# Patient Record
Sex: FEMALE | Race: WHITE | NOT HISPANIC OR LATINO | Employment: OTHER | URBAN - METROPOLITAN AREA
[De-identification: names, ages, dates, MRNs, and addresses within clinical notes are randomized per-mention and may not be internally consistent; named-entity substitution may affect disease eponyms.]

---

## 2017-09-19 ENCOUNTER — TELEPHONE (OUTPATIENT)
Dept: RADIATION ONCOLOGY | Facility: MEDICAL CENTER | Age: 67
End: 2017-09-19

## 2017-09-19 DIAGNOSIS — D32.0 BENIGN NEOPLASM OF CEREBRAL MENINGES (HCC): ICD-10-CM

## 2017-09-19 NOTE — TELEPHONE ENCOUNTER
3. SPECIFIC Action To Be Taken: Lab orders needed     4. Diagnosis/Clinical Reason for Request:MRI with and without scheduled 9/26/17    5. Specialty & Provider Name/Lab/Imaging Location: [facility]     6. Is appointment scheduled for requested order: N\A ;

## 2017-09-20 ENCOUNTER — HOSPITAL ENCOUNTER (OUTPATIENT)
Dept: LAB | Facility: MEDICAL CENTER | Age: 67
End: 2017-09-20
Attending: INTERNAL MEDICINE
Payer: MEDICARE

## 2017-09-20 DIAGNOSIS — E03.4 HYPOTHYROIDISM DUE TO ACQUIRED ATROPHY OF THYROID: ICD-10-CM

## 2017-09-20 DIAGNOSIS — I10 ESSENTIAL HYPERTENSION: ICD-10-CM

## 2017-09-20 DIAGNOSIS — E78.5 HYPERLIPIDEMIA, UNSPECIFIED HYPERLIPIDEMIA TYPE: ICD-10-CM

## 2017-09-20 LAB
ALBUMIN SERPL BCP-MCNC: 3.6 G/DL (ref 3.2–4.9)
ALBUMIN/GLOB SERPL: 1.6 G/DL
ALP SERPL-CCNC: 124 U/L (ref 30–99)
ALT SERPL-CCNC: 30 U/L (ref 2–50)
ANION GAP SERPL CALC-SCNC: 9 MMOL/L (ref 0–11.9)
AST SERPL-CCNC: 23 U/L (ref 12–45)
BASOPHILS # BLD AUTO: 0.7 % (ref 0–1.8)
BASOPHILS # BLD: 0.06 K/UL (ref 0–0.12)
BILIRUB SERPL-MCNC: 1 MG/DL (ref 0.1–1.5)
BUN SERPL-MCNC: 12 MG/DL (ref 8–22)
CALCIUM SERPL-MCNC: 9.3 MG/DL (ref 8.5–10.5)
CHLORIDE SERPL-SCNC: 107 MMOL/L (ref 96–112)
CHOLEST SERPL-MCNC: 195 MG/DL (ref 100–199)
CO2 SERPL-SCNC: 23 MMOL/L (ref 20–33)
CREAT SERPL-MCNC: 0.55 MG/DL (ref 0.5–1.4)
EOSINOPHIL # BLD AUTO: 0.17 K/UL (ref 0–0.51)
EOSINOPHIL NFR BLD: 1.9 % (ref 0–6.9)
ERYTHROCYTE [DISTWIDTH] IN BLOOD BY AUTOMATED COUNT: 42.5 FL (ref 35.9–50)
GFR SERPL CREATININE-BSD FRML MDRD: >60 ML/MIN/1.73 M 2
GLOBULIN SER CALC-MCNC: 2.3 G/DL (ref 1.9–3.5)
GLUCOSE SERPL-MCNC: 105 MG/DL (ref 65–99)
HCT VFR BLD AUTO: 44.2 % (ref 37–47)
HDLC SERPL-MCNC: 88 MG/DL
HGB BLD-MCNC: 14.6 G/DL (ref 12–16)
IMM GRANULOCYTES # BLD AUTO: 0.04 K/UL (ref 0–0.11)
IMM GRANULOCYTES NFR BLD AUTO: 0.5 % (ref 0–0.9)
LDLC SERPL CALC-MCNC: 95 MG/DL
LYMPHOCYTES # BLD AUTO: 2.24 K/UL (ref 1–4.8)
LYMPHOCYTES NFR BLD: 25.7 % (ref 22–41)
MCH RBC QN AUTO: 28.1 PG (ref 27–33)
MCHC RBC AUTO-ENTMCNC: 33 G/DL (ref 33.6–35)
MCV RBC AUTO: 85 FL (ref 81.4–97.8)
MONOCYTES # BLD AUTO: 0.76 K/UL (ref 0–0.85)
MONOCYTES NFR BLD AUTO: 8.7 % (ref 0–13.4)
NEUTROPHILS # BLD AUTO: 5.46 K/UL (ref 2–7.15)
NEUTROPHILS NFR BLD: 62.5 % (ref 44–72)
NRBC # BLD AUTO: 0 K/UL
NRBC BLD AUTO-RTO: 0 /100 WBC
PLATELET # BLD AUTO: 275 K/UL (ref 164–446)
PMV BLD AUTO: 10.8 FL (ref 9–12.9)
POTASSIUM SERPL-SCNC: 3.8 MMOL/L (ref 3.6–5.5)
PROT SERPL-MCNC: 5.9 G/DL (ref 6–8.2)
RBC # BLD AUTO: 5.2 M/UL (ref 4.2–5.4)
SODIUM SERPL-SCNC: 139 MMOL/L (ref 135–145)
T4 FREE SERPL-MCNC: 1.52 NG/DL (ref 0.53–1.43)
TRIGL SERPL-MCNC: 60 MG/DL (ref 0–149)
TSH SERPL DL<=0.005 MIU/L-ACNC: <0.015 UIU/ML (ref 0.3–3.7)
WBC # BLD AUTO: 8.7 K/UL (ref 4.8–10.8)

## 2017-09-20 PROCEDURE — 84439 ASSAY OF FREE THYROXINE: CPT

## 2017-09-20 PROCEDURE — 84443 ASSAY THYROID STIM HORMONE: CPT

## 2017-09-20 PROCEDURE — 36415 COLL VENOUS BLD VENIPUNCTURE: CPT

## 2017-09-20 PROCEDURE — 80061 LIPID PANEL: CPT

## 2017-09-20 PROCEDURE — 85025 COMPLETE CBC W/AUTO DIFF WBC: CPT

## 2017-09-20 PROCEDURE — 80053 COMPREHEN METABOLIC PANEL: CPT

## 2017-09-26 ENCOUNTER — HOSPITAL ENCOUNTER (OUTPATIENT)
Dept: RADIOLOGY | Facility: MEDICAL CENTER | Age: 67
End: 2017-09-26
Attending: RADIOLOGY
Payer: MEDICARE

## 2017-09-26 DIAGNOSIS — D32.9 MENINGIOMA (HCC): ICD-10-CM

## 2017-09-26 PROCEDURE — A9579 GAD-BASE MR CONTRAST NOS,1ML: HCPCS | Performed by: RADIOLOGY

## 2017-09-26 PROCEDURE — 700117 HCHG RX CONTRAST REV CODE 255: Performed by: RADIOLOGY

## 2017-09-26 PROCEDURE — 70553 MRI BRAIN STEM W/O & W/DYE: CPT

## 2017-09-26 RX ADMIN — GADODIAMIDE 17 ML: 287 INJECTION INTRAVENOUS at 08:59

## 2017-09-28 ENCOUNTER — HOSPITAL ENCOUNTER (OUTPATIENT)
Dept: RADIATION ONCOLOGY | Facility: MEDICAL CENTER | Age: 67
End: 2017-09-30
Attending: RADIOLOGY
Payer: MEDICARE

## 2017-09-28 VITALS
WEIGHT: 170 LBS | HEART RATE: 83 BPM | SYSTOLIC BLOOD PRESSURE: 149 MMHG | OXYGEN SATURATION: 94 % | BODY MASS INDEX: 29.18 KG/M2 | TEMPERATURE: 96.9 F | DIASTOLIC BLOOD PRESSURE: 83 MMHG | RESPIRATION RATE: 22 BRPM

## 2017-09-28 DIAGNOSIS — D32.9 MENINGIOMA (HCC): ICD-10-CM

## 2017-09-28 PROCEDURE — 99214 OFFICE O/P EST MOD 30 MIN: CPT | Performed by: RADIOLOGY

## 2017-09-28 PROCEDURE — 99212 OFFICE O/P EST SF 10 MIN: CPT | Performed by: RADIOLOGY

## 2017-09-28 ASSESSMENT — PAIN SCALES - GENERAL: PAINLEVEL: 5=MODERATE PAIN

## 2017-09-28 NOTE — NON-PROVIDER
Patient was seen today in clinic with Dr. Olguin for follow up.  Vitals signs and weight were obtained and pain assessment was completed.  Allergies and medications were reviewed with the patient.  Review of systems completed.     Vitals/Pain:  Vitals:    09/28/17 1117   BP: 149/83   Pulse: 83   Resp: (!) 22   Temp: 36.1 °C (96.9 °F)   SpO2: 94%   Weight: 77.1 kg (170 lb)   Pain Score: 5=Moderate Pain  Location: Back, Rib Cage, Knee, Wrist      Allergies:   Alcohol; Asa [aspirin]; Bee; Iodine; Shellfish allergy; Sulfa drugs; Tetanus antitoxin; Tape; Other environmental; and Synthroid [levothroid]    Current Medications:    Current Outpatient Prescriptions:   •  thyroid (ARMOUR THYROID) 120 MG Tab, TAKE 1/2 TABLET ORALLY TWICE A DAY, Disp: 90 Tab, Rfl: 4  •  thyroid (ARMOUR THYROID) 90 MG Tab, TAKE 1 TABLET BY MOUTH ONCE DAILY, Disp: 90 Tab, Rfl: 4  •  liothyronine (CYTOMEL) 25 MCG Tab, Take 1 Tab by mouth every day., Disp: 90 Tab, Rfl: 4  •  furosemide (LASIX) 20 MG Tab, Take 1 Tab by mouth every day., Disp: 90 Tab, Rfl: 4  •  atenolol (TENORMIN) 50 MG Tab, Take 1 Tab by mouth every day., Disp: 90 Tab, Rfl: 4  •  omeprazole (PRILOSEC) 20 MG delayed-release capsule, Take 1 Cap by mouth every day., Disp: 30 Cap, Rfl: 11  •  trazodone (DESYREL) 150 MG Tab, Take 1 Tab by mouth every bedtime., Disp: 30 Tab, Rfl: 11  •  hydrocodone-acetaminophen (NORCO) 7.5-325 MG per tablet, Take 1-2 Tabs by mouth every 6 hours as needed., Disp: 20 Tab, Rfl: 0      PCP:  Noemy Miller R.N.   9/28/2017  11:23 AM

## 2017-09-28 NOTE — PROGRESS NOTES
RADIATION ONCOLOGY FOLLOW UP    DATE OF SERVICE: 9/28/2017    IDENTIFICATION: Right-sided cavernous sinus meningioma, treated with stereotactic   radiosurgery to 2100 cGy in 3 fractions completing in March 2015.       INTERVAL HISTORY: I had the pleasure of seeing Ms. Whitmore today in follow up for her meningioma.  She states from a symptom standpoint she's been doing quite well since we saw her last year. He does not have any signs or symptoms she would attribute to her brain tumor or treatment. R restaging MRI scan that shows no evidence of active disease. She's otherwise been in her usual health    PHYSICAL EXAM:    Vitals:    09/28/17 1117   BP: 149/83   Pulse: 83   Resp: (!) 22   Temp: 36.1 °C (96.9 °F)   SpO2: 94%   Weight: 77.1 kg (170 lb)   Pain Score: 5=Moderate Pain  Location: Back, Rib Cage, Knee, Wrist      0= Fully active, able to carry on all pre-disease performance without restriction.    PAIN:  5  What makes the pain better: norco  What makes the pain worse: stairs  Pain controlled with current regimen: yes  Pain related to condition being seen here for: no    GENERAL: No apparent distress.  HEENT:  Pupils are equal, round, and reactive to light.  Extraocular muscles   are intact. Sclerae nonicteric.  Conjunctivae pink.  Oral cavity, tongue   protrudes midline.   NECK:  Supple without evidence of thyromegaly.  NODES:  No peripheral adenopathy of the neck, supraclavicular fossa or axillae   bilaterally.  LUNGS:  Clear to ascultation bilaterally   HEART:  Regular rate and rhythm.  No murmur appreciated  ABDOMEN:  Soft. No evidence of hepatosplenomegaly.  Positive bowel sounds.  EXTREMITIES:  Without Edema.  NEUROLOGIC:  Cranial nerves II through XII were intact. Normal stance and gait motor and sensory grossly within normal limits      IMPRESSION: Right-sided cavernous sinus meningioma, treated with stereotactic   radiosurgery to 2100 cGy in 3 fractions completing in March 2015.         RECOMMENDATIONS:    I discussed the patient her findings over a 35 minute time period, 95% of that time dedicated to an ongoing survivorship plan.Discussed with the patient and her  and I would like to at least get one more annual MRI scan of her brain. Findings are still within normal limits and we could consider going to an every other year basis. No plan on seeing her after her MRI next year.    If patient has any questions or concerns, she should feel free to contact me.

## 2017-10-09 ENCOUNTER — TELEPHONE (OUTPATIENT)
Dept: MEDICAL GROUP | Age: 67
End: 2017-10-09

## 2017-10-09 NOTE — TELEPHONE ENCOUNTER
ESTABLISHED PATIENT PRE-VISIT PLANNING     Note: Patient will not be contacted if there is no indication to call.     1.  Reviewed notes from the last few office visits within the medical group: Yes    2.  If any orders were placed at last visit or intended to be done for this visit (i.e. 6 mos follow-up), do we have Results/Consult Notes?        •  Labs - Labs ordered, completed on 9/20/17 and results are in chart.   Note: If patient appointment is for lab review and patient did not complete labs,                check with provider if OK to reschedule patient until labs completed.       •  Imaging - Imaging was not ordered at last office visit.       •  Referrals - No referrals were ordered at last office visit.    3. Is this appointment scheduled as a Hospital Follow-Up? No    4.  Immunizations were updated in Epic using WebIZ?: No WebIZ record       •  Web Iz Recommendations:     5.  Patient is due for the following Health Maintenance Topics:   Health Maintenance Due   Topic Date Due   • Annual Wellness Visit  1950   • IMM DTaP/Tdap/Td Vaccine (1 - Tdap) 12/07/1967   • IMM ZOSTER VACCINE  12/07/2008   • IMM PNEUMOCOCCAL 65+ (ADULT) LOW/MEDIUM RISK SERIES (1 of 2 - PCV13) 12/07/2013   • IMM INFLUENZA (1) 09/01/2017           6.  Patient was NOT informed to arrive 15 min prior to their scheduled appointment and bring in their medication bottles.

## 2017-10-10 ENCOUNTER — OFFICE VISIT (OUTPATIENT)
Dept: MEDICAL GROUP | Age: 67
End: 2017-10-10
Payer: MEDICARE

## 2017-10-10 VITALS
HEIGHT: 64 IN | TEMPERATURE: 97.9 F | HEART RATE: 88 BPM | BODY MASS INDEX: 29.88 KG/M2 | OXYGEN SATURATION: 97 % | SYSTOLIC BLOOD PRESSURE: 124 MMHG | WEIGHT: 175 LBS | DIASTOLIC BLOOD PRESSURE: 70 MMHG

## 2017-10-10 DIAGNOSIS — E78.2 MIXED HYPERLIPIDEMIA: ICD-10-CM

## 2017-10-10 DIAGNOSIS — D32.9 MENINGIOMA (HCC): ICD-10-CM

## 2017-10-10 DIAGNOSIS — E66.9 OBESITY (BMI 30.0-34.9): ICD-10-CM

## 2017-10-10 DIAGNOSIS — E03.4 HYPOTHYROIDISM DUE TO ACQUIRED ATROPHY OF THYROID: ICD-10-CM

## 2017-10-10 DIAGNOSIS — M16.0 PRIMARY OSTEOARTHRITIS OF BOTH HIPS: ICD-10-CM

## 2017-10-10 DIAGNOSIS — D32.9 MENINGIOMA OF RIGHT SPHENOID WING INVOLVING CAVERNOUS SINUS (HCC): ICD-10-CM

## 2017-10-10 DIAGNOSIS — I10 ESSENTIAL HYPERTENSION: ICD-10-CM

## 2017-10-10 DIAGNOSIS — M54.6 ACUTE MIDLINE THORACIC BACK PAIN: ICD-10-CM

## 2017-10-10 DIAGNOSIS — Z91.81: ICD-10-CM

## 2017-10-10 DIAGNOSIS — R73.01 IMPAIRED FASTING GLUCOSE: ICD-10-CM

## 2017-10-10 DIAGNOSIS — K21.00 GASTROESOPHAGEAL REFLUX DISEASE WITH ESOPHAGITIS: ICD-10-CM

## 2017-10-10 DIAGNOSIS — R07.81 RIB PAIN ON RIGHT SIDE: ICD-10-CM

## 2017-10-10 PROCEDURE — 99215 OFFICE O/P EST HI 40 MIN: CPT | Performed by: INTERNAL MEDICINE

## 2017-10-10 ASSESSMENT — ENCOUNTER SYMPTOMS
RESPIRATORY NEGATIVE: 1
EYES NEGATIVE: 1
CARDIOVASCULAR NEGATIVE: 1
MUSCULOSKELETAL NEGATIVE: 1
PSYCHIATRIC NEGATIVE: 1
CONSTITUTIONAL NEGATIVE: 1
NEUROLOGICAL NEGATIVE: 1
GASTROINTESTINAL NEGATIVE: 1

## 2017-10-11 NOTE — PROGRESS NOTES
"Subjective:      Amanda Whitmore is a 66 y.o. female who presents with Rib Pain (right side rib cage pain s/p fall x 2 weeks ago )  The patient is here for followup of chronic medical problems listed below. The patient is compliant with medications and having no side effects from them. Denies chest pain, abdominal pain, dyspnea, myalgias, or cough.      Outpatient Medications Prior to Visit   Medication Sig Dispense Refill   • thyroid (ARMOUR THYROID) 120 MG Tab TAKE 1/2 TABLET ORALLY TWICE A DAY 90 Tab 4   • thyroid (ARMOUR THYROID) 90 MG Tab TAKE 1 TABLET BY MOUTH ONCE DAILY 90 Tab 4   • liothyronine (CYTOMEL) 25 MCG Tab Take 1 Tab by mouth every day. 90 Tab 4   • furosemide (LASIX) 20 MG Tab Take 1 Tab by mouth every day. 90 Tab 4   • atenolol (TENORMIN) 50 MG Tab Take 1 Tab by mouth every day. 90 Tab 4   • omeprazole (PRILOSEC) 20 MG delayed-release capsule Take 1 Cap by mouth every day. 30 Cap 11   • trazodone (DESYREL) 150 MG Tab Take 1 Tab by mouth every bedtime. 30 Tab 11   • hydrocodone-acetaminophen (NORCO) 7.5-325 MG per tablet Take 1-2 Tabs by mouth every 6 hours as needed. 20 Tab 0     No facility-administered medications prior to visit.        Allergies   Allergen Reactions   • Alcohol Anaphylaxis     ETOH   • Asa [Aspirin] Anaphylaxis   • Bee Anaphylaxis   • Iodine Hives and Shortness of Breath     Contrast; \"will black out\"   • Shellfish Allergy Anaphylaxis   • Sulfa Drugs Anaphylaxis     \"will kill me\"   • Tetanus Antitoxin Nausea and Swelling     \"Swelling and violently ill\"; increased temp!!!!!!   • Tape Contact Dermatitis     Paper tape ok; will get welts and peeling skin with regular tape   • Other Environmental      Pt states that she is allergic \"to the sun\"   • Synthroid [Levothroid]      Nausea fatigue malaise     .          HPI    Review of Systems   Constitutional: Negative.    HENT: Negative.    Eyes: Negative.    Respiratory: Negative.    Cardiovascular: Negative.  " "  Gastrointestinal: Negative.    Genitourinary: Negative.    Musculoskeletal: Negative.    Skin: Negative.    Neurological: Negative.    Endo/Heme/Allergies: Negative.    Psychiatric/Behavioral: Negative.           Objective:     /70   Pulse 88   Temp 36.6 °C (97.9 °F)   Ht 1.626 m (5' 4.02\")   Wt 79.4 kg (175 lb)   SpO2 97%   BMI 30.02 kg/m²      Physical Exam   Constitutional: She is oriented to person, place, and time. She appears well-developed and well-nourished. No distress.   HENT:   Head: Normocephalic and atraumatic.   Right Ear: External ear normal.   Left Ear: External ear normal.   Nose: Nose normal.   Mouth/Throat: Oropharynx is clear and moist. No oropharyngeal exudate.   Eyes: Conjunctivae and EOM are normal. Pupils are equal, round, and reactive to light. Right eye exhibits no discharge. Left eye exhibits no discharge. No scleral icterus.   Neck: Normal range of motion. Neck supple. No JVD present. No tracheal deviation present. No thyromegaly present.   Cardiovascular: Normal rate, regular rhythm, normal heart sounds and intact distal pulses.  Exam reveals no gallop and no friction rub.    No murmur heard.  Pulmonary/Chest: Effort normal and breath sounds normal. No stridor. No respiratory distress. She has no wheezes. She has no rales. She exhibits no tenderness.   Abdominal: Soft. Bowel sounds are normal. She exhibits no distension and no mass. There is no tenderness. There is no rebound and no guarding.   Musculoskeletal: Normal range of motion. She exhibits no edema or tenderness.   Lymphadenopathy:     She has no cervical adenopathy.   Neurological: She is alert and oriented to person, place, and time. She has normal reflexes. She displays normal reflexes. No cranial nerve deficit. She exhibits normal muscle tone. Coordination normal.   Skin: Skin is warm and dry. No rash noted. She is not diaphoretic. No erythema. No pallor.   Psychiatric: She has a normal mood and affect. Her " behavior is normal. Judgment and thought content normal.   Vitals reviewed.    Hospital Outpatient Visit on 09/20/2017   Component Date Value   • WBC 09/20/2017 8.7    • RBC 09/20/2017 5.20    • Hemoglobin 09/20/2017 14.6    • Hematocrit 09/20/2017 44.2    • MCV 09/20/2017 85.0    • MCH 09/20/2017 28.1    • MCHC 09/20/2017 33.0*   • RDW 09/20/2017 42.5    • Platelet Count 09/20/2017 275    • MPV 09/20/2017 10.8    • Neutrophils-Polys 09/20/2017 62.50    • Lymphocytes 09/20/2017 25.70    • Monocytes 09/20/2017 8.70    • Eosinophils 09/20/2017 1.90    • Basophils 09/20/2017 0.70    • Immature Granulocytes 09/20/2017 0.50    • Nucleated RBC 09/20/2017 0.00    • Neutrophils (Absolute) 09/20/2017 5.46    • Lymphs (Absolute) 09/20/2017 2.24    • Monos (Absolute) 09/20/2017 0.76    • Eos (Absolute) 09/20/2017 0.17    • Baso (Absolute) 09/20/2017 0.06    • Immature Granulocytes (a* 09/20/2017 0.04    • NRBC (Absolute) 09/20/2017 0.00    • Sodium 09/20/2017 139    • Potassium 09/20/2017 3.8    • Chloride 09/20/2017 107    • Co2 09/20/2017 23    • Anion Gap 09/20/2017 9.0    • Glucose 09/20/2017 105*   • Bun 09/20/2017 12    • Creatinine 09/20/2017 0.55    • Calcium 09/20/2017 9.3    • AST(SGOT) 09/20/2017 23    • ALT(SGPT) 09/20/2017 30    • Alkaline Phosphatase 09/20/2017 124*   • Total Bilirubin 09/20/2017 1.0    • Albumin 09/20/2017 3.6    • Total Protein 09/20/2017 5.9*   • Globulin 09/20/2017 2.3    • A-G Ratio 09/20/2017 1.6    • Cholesterol,Tot 09/20/2017 195    • Triglycerides 09/20/2017 60    • HDL 09/20/2017 88    • LDL 09/20/2017 95    • TSH 09/20/2017 <0.015*   • Free T-4 09/20/2017 1.52*   • GFR If  09/20/2017 >60    • GFR If Non  [employer]i* 09/20/2017 >60       Lab Results   Component Value Date/Time    HBA1C 5.8 (H) 02/18/2014 06:04 AM    HBA1C 6.0 (H) 08/08/2013 08:11 AM     Lab Results   Component Value Date/Time    SODIUM 139 09/20/2017 06:06 AM    POTASSIUM 3.8 09/20/2017 06:06 AM     CHLORIDE 107 09/20/2017 06:06 AM    CO2 23 09/20/2017 06:06 AM    GLUCOSE 105 (H) 09/20/2017 06:06 AM    BUN 12 09/20/2017 06:06 AM    CREATININE 0.55 09/20/2017 06:06 AM    CREATININE 0.63 01/31/2013 08:06 AM    BUNCREATRAT 29 (H) 08/08/2013 08:11 AM    BUNCREATRAT 29 (H) 01/31/2013 08:06 AM    ALKPHOSPHAT 124 (H) 09/20/2017 06:06 AM    ASTSGOT 23 09/20/2017 06:06 AM    ALTSGPT 30 09/20/2017 06:06 AM    TBILIRUBIN 1.0 09/20/2017 06:06 AM     No results found for: INR  Lab Results   Component Value Date/Time    CHOLSTRLTOT 195 09/20/2017 06:06 AM    LDL 95 09/20/2017 06:06 AM    HDL 88 09/20/2017 06:06 AM    TRIGLYCERIDE 60 09/20/2017 06:06 AM       No results found for: TESTOSTERONE  Lab Results   Component Value Date/Time    TSH <0.006 (L) 08/08/2013 08:11 AM    TSH <0.006 (L) 01/31/2013 08:06 AM     Lab Results   Component Value Date/Time    FREET4 1.52 (H) 09/20/2017 06:06 AM    FREET4 1.33 09/27/2016 07:32 AM     No results found for: URICACID  No components found for: VITB12  Lab Results   Component Value Date/Time    25HYDROXY 19 (L) 09/27/2016 07:32 AM    25HYDROXY 28 (L) 02/18/2016 06:07 AM     Who is late on            Assessment/Plan:     1. Rib pain on right side-injury 2 weeks ago-New problem. Fell on right side 2 weeks ago and may have sustained a fracture. Also rule out pneumothorax.    Pain medication for now.  - [id number] (WITH 1-VIEW CXR) RIGHT; Future    2. Meningioma of right sphenoid wing involving cavernous sinus (CMS-HCC)       Under good control. Continue same regimen.    3. Meningioma- dr russo; no change in mri done 2017 since 2015     Under good control. Continue same regimen.    4. Essential hypertension       Under good control. Continue same regimen.  5. Mixed hyperlipidemia     Under good control. Continue same regimen.    6. Hypothyroidism due to acquired atrophy of thyroid      Under good control. Continue same regimen.  - T3 FREE; Future    7. Acute midline thoracic back  pain- r/o compression rx thoracic and lumbar spine     - DX-THORACOLUMBAR SPINE-2 VIEWS; Future    8. Primary osteoarthritis of both hips- dr browne; also rx's  Norco      Under good control. Continue same regimen.  9. Impaired fasting glucose    Under good control. Continue same regimen.  10. Gastroesophageal reflux disease with esophagitis     Under good control. Continue same regimen.    11. At high risk for falls per Allyson fall risk assessment scale      - Patient identified as fall risk.  Appropriate orders and counseling given.    12. Obesity (BMI 30.0-34.9)       diet/exercise/lose 15 lbs.; patient counseled    - Patient identified as having weight management issue.  Appropriate orders and counseling given.      40 minute face-to-face encounter took place today.  More than half of this time was spent in the coordination of care of the above problems, as well as counseling.

## 2017-10-12 ENCOUNTER — HOSPITAL ENCOUNTER (OUTPATIENT)
Dept: LAB | Facility: MEDICAL CENTER | Age: 67
End: 2017-10-12
Attending: INTERNAL MEDICINE
Payer: MEDICARE

## 2017-10-12 DIAGNOSIS — E03.4 HYPOTHYROIDISM DUE TO ACQUIRED ATROPHY OF THYROID: ICD-10-CM

## 2017-10-12 LAB — T3FREE SERPL-MCNC: 5.3 PG/ML (ref 2.4–4.2)

## 2017-10-12 PROCEDURE — 84481 FREE ASSAY (FT-3): CPT

## 2017-10-12 PROCEDURE — 36415 COLL VENOUS BLD VENIPUNCTURE: CPT

## 2017-10-13 ENCOUNTER — HOSPITAL ENCOUNTER (OUTPATIENT)
Dept: RADIOLOGY | Facility: MEDICAL CENTER | Age: 67
End: 2017-10-13
Attending: INTERNAL MEDICINE
Payer: MEDICARE

## 2017-10-13 DIAGNOSIS — R07.81 RIB PAIN ON RIGHT SIDE: ICD-10-CM

## 2017-10-13 DIAGNOSIS — M54.6 ACUTE MIDLINE THORACIC BACK PAIN: ICD-10-CM

## 2017-10-13 PROCEDURE — 71101 X-RAY EXAM UNILAT RIBS/CHEST: CPT | Mod: RT

## 2017-10-13 PROCEDURE — 72080 X-RAY EXAM THORACOLMB 2/> VW: CPT

## 2017-10-26 ENCOUNTER — TELEPHONE (OUTPATIENT)
Dept: MEDICAL GROUP | Age: 67
End: 2017-10-26

## 2017-10-26 NOTE — TELEPHONE ENCOUNTER
ANNUAL WELLNESS VISIT PRE-VISIT PLANNING     1.  Reviewed note from last office visit with PCP: YES    2.  If any orders were placed at last visit, do we have Results/Consult Notes?        •  Labs - Labs ordered, completed on 10/12 and results are in chart.   Note: If patient appointment is for lab review and patient did not complete labs,                check with provider if OK to reschedule patient until labs completed.       •  Imaging - Imaging ordered, completed and results are in chart.       •  Referrals - No referrals were ordered at last office visit.    3.  Immunizations were updated in Epic using WebIZ?: No WebIZ record       •  WebIZ Recommendations: FLU, PREVNAR (PCV13) , TDAP and ZOSTAVAX (Shingles)       •  Is patient due for Tdap? YES. Patient was notified of copay/out of pocket cost.       •  Is patient due for Shingles? YES. Patient was notified of copay/out of pocket cost.     4.  Patient is due for the following Health Maintenance Topics:   Health Maintenance Due   Topic Date Due   • Annual Wellness Visit  1950   • IMM DTaP/Tdap/Td Vaccine (1 - Tdap) 12/07/1967   • IMM ZOSTER VACCINE  12/07/2008   • IMM PNEUMOCOCCAL 65+ (ADULT) LOW/MEDIUM RISK SERIES (1 of 2 - PCV13) 12/07/2013   • IMM INFLUENZA (1) 09/01/2017       - Patient is up-to-date on all Health Maintenance topics. No records have been requested at this time.    5.  Reviewed/Updated the following with patient:       •   Preferred Pharmacy? YES       •   Preferred Lab? YES       •   Preferred Communication? YES       •   Allergies? YES       •   Medications? YES. Was Abstract Encounter opened and chart updated? YES       •   Social History? YES. Was Abstract Encounter opened and chart updated? YES       •   Family History (document living status of immediate family members and if + hx of cancer, diabetes, hypertension, hyperlipidemia, heart attack, stroke) YES. Was Abstract Encounter opened and chart updated? YES    6.  Care Team  Updated:       •   DME Company (gait device, O2, CPAP, etc.): N\A       •   Other Specialists (eye doctor, derm, GYN, cardiology, endo, etc): N\A    7.  Patient has the following Care Path diagnoses on Problem List:  NONE    8.  Specialty Comments was updated with diagnosis information provided by SCP: N\A    9.  Patient was advised: “This is a free wellness visit. The provider will screen for medical conditions to help you stay healthy. If you have other concerns to address you may be asked to discuss these at a separate visit or there may be an additional fee.”     6.  Patient was informed to arrive 15 min prior to their scheduled appointment and bring in their medication bottles.

## 2017-10-30 ENCOUNTER — OFFICE VISIT (OUTPATIENT)
Dept: MEDICAL GROUP | Age: 67
End: 2017-10-30
Payer: MEDICARE

## 2017-10-30 VITALS
HEART RATE: 74 BPM | WEIGHT: 177 LBS | BODY MASS INDEX: 30.22 KG/M2 | RESPIRATION RATE: 16 BRPM | DIASTOLIC BLOOD PRESSURE: 78 MMHG | OXYGEN SATURATION: 95 % | HEIGHT: 64 IN | SYSTOLIC BLOOD PRESSURE: 128 MMHG

## 2017-10-30 DIAGNOSIS — M16.0 PRIMARY OSTEOARTHRITIS OF BOTH HIPS: ICD-10-CM

## 2017-10-30 DIAGNOSIS — F51.01 PRIMARY INSOMNIA: ICD-10-CM

## 2017-10-30 DIAGNOSIS — K21.9 GASTROESOPHAGEAL REFLUX DISEASE, ESOPHAGITIS PRESENCE NOT SPECIFIED: ICD-10-CM

## 2017-10-30 DIAGNOSIS — E55.9 HYPOVITAMINOSIS D: ICD-10-CM

## 2017-10-30 DIAGNOSIS — R07.81 RIB PAIN ON RIGHT SIDE: ICD-10-CM

## 2017-10-30 DIAGNOSIS — G47.30 SLEEP APNEA, UNSPECIFIED TYPE: ICD-10-CM

## 2017-10-30 DIAGNOSIS — N39.0 RECURRENT UTI: ICD-10-CM

## 2017-10-30 DIAGNOSIS — R73.01 IMPAIRED FASTING GLUCOSE: ICD-10-CM

## 2017-10-30 DIAGNOSIS — I10 ESSENTIAL HYPERTENSION: ICD-10-CM

## 2017-10-30 DIAGNOSIS — E03.4 HYPOTHYROIDISM DUE TO ACQUIRED ATROPHY OF THYROID: ICD-10-CM

## 2017-10-30 DIAGNOSIS — E66.9 OBESITY (BMI 30.0-34.9): ICD-10-CM

## 2017-10-30 DIAGNOSIS — D32.9 MENINGIOMA OF RIGHT SPHENOID WING INVOLVING CAVERNOUS SINUS (HCC): ICD-10-CM

## 2017-10-30 DIAGNOSIS — K21.00 GASTROESOPHAGEAL REFLUX DISEASE WITH ESOPHAGITIS: ICD-10-CM

## 2017-10-30 DIAGNOSIS — E78.2 MIXED HYPERLIPIDEMIA: ICD-10-CM

## 2017-10-30 DIAGNOSIS — M20.10 ACQUIRED HALLUX VALGUS, UNSPECIFIED LATERALITY: ICD-10-CM

## 2017-10-30 DIAGNOSIS — R60.9 EDEMA, UNSPECIFIED TYPE: ICD-10-CM

## 2017-10-30 DIAGNOSIS — M54.6 ACUTE MIDLINE THORACIC BACK PAIN: ICD-10-CM

## 2017-10-30 DIAGNOSIS — Z00.00 HEALTHCARE MAINTENANCE: ICD-10-CM

## 2017-10-30 DIAGNOSIS — M79.89 LEG SWELLING: ICD-10-CM

## 2017-10-30 DIAGNOSIS — H53.2 DIPLOPIA: ICD-10-CM

## 2017-10-30 DIAGNOSIS — Z12.11 SCREEN FOR COLON CANCER: ICD-10-CM

## 2017-10-30 DIAGNOSIS — Z91.81 RISK FOR FALLS: ICD-10-CM

## 2017-10-30 PROCEDURE — G0438 PPPS, INITIAL VISIT: HCPCS | Performed by: INTERNAL MEDICINE

## 2017-10-30 RX ORDER — THYROID 90 MG/1
TABLET ORAL
Qty: 90 TAB | Refills: 4 | Status: SHIPPED | OUTPATIENT
Start: 2017-10-30 | End: 2018-10-30 | Stop reason: SDUPTHER

## 2017-10-30 RX ORDER — OMEPRAZOLE 20 MG/1
20 CAPSULE, DELAYED RELEASE ORAL DAILY
Qty: 30 CAP | Refills: 11
Start: 2017-10-30 | End: 2018-10-30

## 2017-10-30 RX ORDER — NITROFURANTOIN 25 MG/5ML
50 SUSPENSION ORAL PRN
Qty: 1 BOTTLE | Refills: 4 | Status: SHIPPED | OUTPATIENT
Start: 2017-10-30 | End: 2017-10-30 | Stop reason: SDUPTHER

## 2017-10-30 RX ORDER — TRAZODONE HYDROCHLORIDE 150 MG/1
150 TABLET ORAL
Qty: 30 TAB | Refills: 11
Start: 2017-10-30 | End: 2018-10-30 | Stop reason: SDUPTHER

## 2017-10-30 RX ORDER — FUROSEMIDE 20 MG/1
20 TABLET ORAL DAILY
Qty: 90 TAB | Refills: 4 | Status: SHIPPED | OUTPATIENT
Start: 2017-10-30 | End: 2018-10-30 | Stop reason: SDUPTHER

## 2017-10-30 RX ORDER — THYROID 120 MG/1
TABLET ORAL
Qty: 90 TAB | Refills: 4 | Status: SHIPPED | OUTPATIENT
Start: 2017-10-30 | End: 2018-10-30 | Stop reason: SDUPTHER

## 2017-10-30 RX ORDER — LIOTHYRONINE SODIUM 25 UG/1
25 TABLET ORAL DAILY
Qty: 90 TAB | Refills: 4 | Status: SHIPPED | OUTPATIENT
Start: 2017-10-30 | End: 2018-10-30 | Stop reason: SDUPTHER

## 2017-10-30 RX ORDER — NITROFURANTOIN 25 MG/5ML
50 SUSPENSION ORAL 4 TIMES DAILY
Qty: 400 ML | Refills: 4 | Status: SHIPPED | OUTPATIENT
Start: 2017-10-30 | End: 2018-10-30 | Stop reason: CLARIF

## 2017-10-30 RX ORDER — NITROFURANTOIN 25 MG/5ML
50 SUSPENSION ORAL 4 TIMES DAILY
COMMUNITY
End: 2017-10-30

## 2017-10-30 RX ORDER — YEAST,DRIED (S. CEREVISIAE)
POWDER (GRAM) ORAL DAILY
COMMUNITY

## 2017-10-30 ASSESSMENT — PATIENT HEALTH QUESTIONNAIRE - PHQ9: CLINICAL INTERPRETATION OF PHQ2 SCORE: 0

## 2017-10-30 NOTE — PROGRESS NOTES
Chief Complaint   Patient presents with   • Annual Wellness Visit     Rx filled         HPI:  Amanda Carr is a 66 y.o. 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 (CMS-HCC)- RT 2015, dr russo; no change in mri 2017 10/10/2017   • Acute midline thoracic back pain- r/o compression rx thoracic and lumbar spine 10/10/2017   • Obesity (BMI 30.0-34.9) 10/10/2017   • Acquired hallux valgus 12/15/2016   • Diplopia- dr gould and dr hernandez 10/02/2015   • Primary osteoarthritis of both hips- dr browne; also rx's  Norco 10/02/2015   • Impaired fasting glucose 04/29/2013   • Leg swelling-right, chronic- SUPPORT HOSE 10/24/2012   • Essential hypertension 08/23/2011   • Mixed hyperlipidemia 08/23/2011   • Edema 08/23/2011   • Hypothyroidism due to acquired atrophy of thyroid 08/23/2011   • Hypovitaminosis D 08/23/2011   • Insomnia 08/23/2011   • Sleep apnea-  NASAL STRIPS; NO CPAP 08/23/2011   • GERD (gastroesophageal reflux disease) 08/23/2011       Current Outpatient Prescriptions   Medication Sig Dispense Refill   • nitrofurantoin (FURADANTIN) 25 MG/5ML Suspension Take 50 mg by mouth 4 times a day.     • Glucos-Chond-Hyal Ac-Ca Fructo (MOVE FREE JOINT HEALTH ADVANCE) Tab Take  by mouth every day.     • thyroid (ARMOUR THYROID) 120 MG Tab TAKE 1/2 TABLET ORALLY TWICE A DAY 90 Tab 4   • thyroid (ARMOUR THYROID) 90 MG Tab TAKE 1 TABLET BY MOUTH ONCE DAILY 90 Tab 4   • liothyronine (CYTOMEL) 25 MCG Tab Take 1 Tab by mouth every day. 90 Tab 4   • furosemide (LASIX) 20 MG Tab Take 1 Tab by mouth every day. 90 Tab 4   • atenolol (TENORMIN) 50 MG Tab Take 1 Tab by mouth every day. 90 Tab 4   • omeprazole (PRILOSEC) 20 MG delayed-release capsule Take 1 Cap by mouth every day. 30 Cap 11   • hydrocodone-acetaminophen (NORCO) 7.5-325 MG per tablet Take 1-2 Tabs  by mouth every 6 hours as needed. 20 Tab 0   • trazodone (DESYREL) 150 MG Tab Take 1 Tab by mouth every bedtime. 30 Tab 11     No current facility-administered medications for this visit.         Patient is taking medications as noted in medication list.  Current supplements as per medication list.     Allergies: Alcohol; Asa [aspirin]; Bee; Iodine; Shellfish allergy; Sulfa drugs; Tetanus antitoxin; Tape; Other environmental; and Synthroid [levothroid]    Current social contact/activities: Pt reports that she spends time with her , but stays at home most of the time.        Is patient current with immunizations? No, due for FLU, PREVNAR (PCV13) , TDAP and ZOSTAVAX (Shingles). Patient is interested in receiving NONE today.    She  reports that she quit smoking about 52 years ago. Her smoking use included Cigarettes. She has a 4.00 pack-year smoking history. She has never used smokeless tobacco. She reports that she does not drink alcohol or use drugs.  Counseling given: Not Answered        DPA/Advanced directive: Patient has Living Will on file.     ROS:    Gait: Uses no assistive device    Ostomy: no    Other tubes: no    Amputations: no    Chronic oxygen use no    Last eye exam July of this year.    Wears hearing aids: no    : Denies incontinence.         Depression Screening    Little interest or pleasure in doing things?  0 - not at all  Feeling down, depressed, or hopeless? 0 - not at all  Patient Health Questionnaire Score: 0    If depressive symptoms identified deferred to follow up visit unless specifically addressed in assessment and plan.    Interpretation of PHQ-9 Total Score   Score Severity   1-4 No Depression   5-9 Mild Depression   10-14 Moderate Depression   15-19 Moderately Severe Depression   20-27 Severe Depression    Screening for Cognitive Impairment    Three Minute Recall (apple, watch, terrell)  3/3    Draw clock face with all 12 numbers set to the hand to show 10 minutes past 11 o'clock   1 5/5  If cognitive concerns identified, deferred for follow up unless specifically addressed in assessment and plan.    Fall Risk Assessment    Has the patient had two or more falls in the last year or any fall with injury in the last year?  Yes  If fall risk identified, deferred for follow up unless specifically addressed in assessment and plan.    Safety Assessment    Throw rugs on floor.  Yes  Handrails on all stairs.  Yes  Good lighting in all hallways.  Yes  Difficulty hearing.  No  Patient counseled about all safety risks that were identified.    Functional Assessment ADLs    Are there any barriers preventing you from cooking for yourself or meeting nutritional needs?  No.    Are there any barriers preventing you from driving safely or obtaining transportation?  No.    Are there any barriers preventing you from using a telephone or calling for help?  No.    Are there any barriers preventing you from shopping?  No.    Are there any barriers preventing you from taking care of your own finances?  No.    Are there any barriers preventing you from managing your medications?  No.    Are you currently engaging any exercise or physical activity?  Yes.  Pt reports that she takes care of her house.     Health Maintenance Summary                Annual Wellness Visit Overdue 1950     IMM DTaP/Tdap/Td Vaccine Overdue 12/7/1967     IMM ZOSTER VACCINE Overdue 12/7/2008     IMM PNEUMOCOCCAL 65+ (ADULT) LOW/MEDIUM RISK SERIES Overdue 12/7/2013     IMM INFLUENZA Overdue 9/1/2017     MAMMOGRAM Next Due 11/14/2017      Patient Declined 11/14/2016      Patient has more history with this topic...    COLONOSCOPY Next Due 10/7/2018      Done 10/7/2008 AMB REFERRAL TO GI FOR COLONOSCOPY    BONE DENSITY Next Due 4/13/2020      Done 4/13/2015 DS-BONE DENSITY STUDY (DEXA)     Patient has more history with this topic...          Patient Care Team:  Cricket Salguero M.D. as PCP - General  Reid Dinero D.O. as Consulting  "Physician (Neurology)  Shankar Matos M.D. as Consulting Physician (Otolaryngology)  Frank Robertson M.D. as Consulting Physician (Orthopaedics)  Shankar Valdivia D.C. as Consulting Physician (Chiropractic)    Social History   Substance Use Topics   • Smoking status: Former Smoker     Packs/day: 1.00     Years: 4.00     Types: Cigarettes     Quit date: 1/1/1965   • Smokeless tobacco: Never Used   • Alcohol use No      Comment: ALLERGIC TO IT     Family History   Problem Relation Age of Onset   • Heart Attack Mother    • Cancer Father    • Hypertension Father    • Stroke Father      She  has a past medical history of Anesthesia; Arthritis; Bronchitis (6/2014); Cancer (CMS-[facility]) (2015); Coughing blood; GERD (gastroesophageal reflux disease); Heart burn; Hiatus hernia syndrome; Hypertension; Indigestion; Pain (04/16/14); Pneumonia (12/2013); Renal disorder; Sleep apnea; Snoring; Thyroid disease; and Type II or unspecified type diabetes mellitus without mention of complication, not stated as uncontrolled.   Past Surgical History:   Procedure Laterality Date   • BUNIONECTOMY DIXIE Right 12/15/2016    Procedure: BUNIONECTOMY DIXIE - BRIAN;  Surgeon: Shankar Armas D.P.M.;  Location: SURGERY [facility];  Service:    • JOINT INJECTION DIAGNOSTIC  4/16/2015    Performed by Frank Robertson M.D. at [facility]   • JOINT INJECTION DIAGNOSTIC  8/28/2014    Performed by Frank Robertson M.D. at [facility]   • JOINT INJECTION DIAGNOSTIC  4/17/2014    Performed by Frank Robertson M.D. at [facility]   • CHOLECYSTECTOMY  1998   • ABDOMINAL HYSTERECTOMY TOTAL  1980's   • TONSILLECTOMY      as a child           Exam:     Blood pressure 138/80, pulse 74, resp. rate 16, height 1.613 m (5' 3.5\"), weight 80.3 kg (177 lb), SpO2 95 %. Body mass index is 30.86 kg/m².    Hearing excellent.    Dentition good  Alert, oriented in no acute distress.  Eye contact is good, speech goal directed, " affect calm        Assessment and Plan. The following treatment and monitoring plan is recommended:     1. Healthcare maintenance     - Initial Wellness Visit - Includes PPPS ()    2. Recurrent UTI     - nitrofurantoin (FURADANTIN) 25 MG/5ML Suspension; Take 10 mL by mouth as needed.  Dispense: 1 Bottle; Refill: 4  - Initial Wellness Visit - Includes PPPS ()    3. Essential hypertension   Under good control. Continue same regimen.    - Initial Wellness Visit - Includes PPPS ()    4. Mixed hyperlipidemia    Under good control. Continue same regimen.- TSH; Future  - COMP METABOLIC PANEL; Future  - LIPID PROFILE; Future  - CBC WITH DIFFERENTIAL; Future  - Initial Wellness Visit - Includes PPPS ()    5. Edema, unspecified type    Under good control. Continue same regimen.  - furosemide (LASIX) 20 MG Tab; Take 1 Tab by mouth every day.  Dispense: 90 Tab; Refill: 4  - Initial Wellness Visit - Includes PPPS ()    6. Hypothyroidism due to acquired atrophy of thyroid     Under good control. Continue same regimen.  - thyroid (ARMOUR THYROID) 120 MG Tab; TAKE 1/2 TABLET ORALLY TWICE A DAY  Dispense: 90 Tab; Refill: 4  - thyroid (ARMOUR THYROID) 90 MG Tab; TAKE 1 TABLET BY MOUTH ONCE DAILY  Dispense: 90 Tab; Refill: 4  - liothyronine (CYTOMEL) 25 MCG Tab; Take 1 Tab by mouth every day.  Dispense: 90 Tab; Refill: 4  - Initial Wellness Visit - Includes PPPS ()    7. Hypovitaminosis D    Under good control. Continue same regimen.  - Initial Wellness Visit - Includes PPPS ()    8. Primary insomnia    Under good control. Continue same regimen.- trazodone (DESYREL) 150 MG Tab; Take 1 Tab by mouth every bedtime.  Dispense: 30 Tab; Refill: 11  - Initial Wellness Visit - Includes PPPS ()    9. Sleep apnea, unspecified type    Under good control. Continue same regimen.- Initial Wellness Visit - Includes PPPS ()    10. Gastroesophageal reflux disease, esophagitis presence not specified    Under  good control. Continue same regimen.- Initial Wellness Visit - Includes PPPS ()    11. Leg swelling-right, chronic- SUPPORT HOSE    Under good control. Continue same regimen.  - Initial Wellness Visit - Includes PPPS ()    12. Impaired fasting glucose    Under good control. Continue same regimen.  - Initial Wellness Visit - Includes PPPS ()    13. Diplopia- dr gould and dr hernandez    Under good control. Continue same regimen.  - Initial Wellness Visit - Includes PPPS ()    14. Primary osteoarthritis of both hips- dr browne; also rx's  Norco     Under good control. Continue same regimen.- Initial Wellness Visit - Includes PPPS ()    15. Acquired hallux valgus, unspecified laterality    Under good control. Continue same regimen.  - Initial Wellness Visit - Includes PPPS ()    16. Rib pain on right side-injury 2 weeks ago     Under good control. Continue same regimen.  - Initial Wellness Visit - Includes PPPS ()    17. 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 Continue same regimen.  - Initial Wellness Visit - Includes PPPS ()    18. Acute midline thoracic back pain- r/o compression rx thoracic and lumbar spine    Under good control. Continue same regimen.  - Initial Wellness Visit - Includes PPPS ()    19. Obesity (BMI 30.0-34.9)    Under good control. Continue same regimen.- OBESITY COUNSELING (No Charge): Patient identified as having weight management issue.  Appropriate orders and counseling given.  - Initial Wellness Visit - Includes PPPS ()    20. Screen for colon cancer     - OCCULT BLOOD FECES IMMUNOASSAY; Future  - Initial Wellness Visit - Includes PPPS ()    21. Gastroesophageal reflux disease with esophagitis     Under good control. Continue same regimen.  - omeprazole (PRILOSEC) 20 MG delayed-release capsule; Take 1 Cap by mouth every day.  Dispense: 30 Cap;  Refill: 11  - Initial Wellness Visit - Includes PPPS ()    22. Risk for falls     - Patient identified as fall risk.  Appropriate orders and counseling given.  - Initial Wellness Visit - Includes PPPS ()        Services suggested: No services needed at this time  Health Care Screening recommendations as per orders if indicated.  Referrals offered: PT/OT/Nutrition counseling/Behavioral Health/Smoking cessation as per orders if indicated.    Discussion today about general wellness and lifestyle habits:    · Prevent falls and reduce trip hazards; Cautioned about securing or removing rugs.  · Have a working fire alarm and carbon monoxide detector;   · Engage in regular physical activity and social activities       Follow-up: No Follow-up on file.

## 2017-12-08 ENCOUNTER — TELEPHONE (OUTPATIENT)
Dept: HEALTH INFORMATION MANAGEMENT | Facility: OTHER | Age: 67
End: 2017-12-08

## 2017-12-08 ENCOUNTER — PATIENT OUTREACH (OUTPATIENT)
Dept: HEALTH INFORMATION MANAGEMENT | Facility: OTHER | Age: 67
End: 2017-12-08

## 2018-01-04 ENCOUNTER — OFFICE VISIT (OUTPATIENT)
Dept: MEDICAL GROUP | Age: 68
End: 2018-01-04
Payer: MEDICARE

## 2018-01-04 VITALS
SYSTOLIC BLOOD PRESSURE: 126 MMHG | BODY MASS INDEX: 28.85 KG/M2 | DIASTOLIC BLOOD PRESSURE: 78 MMHG | OXYGEN SATURATION: 97 % | HEART RATE: 92 BPM | HEIGHT: 64 IN | RESPIRATION RATE: 16 BRPM | WEIGHT: 169 LBS | TEMPERATURE: 97.5 F

## 2018-01-04 DIAGNOSIS — J20.9 ACUTE BRONCHITIS DUE TO INFECTION: ICD-10-CM

## 2018-01-04 PROCEDURE — 99214 OFFICE O/P EST MOD 30 MIN: CPT | Performed by: INTERNAL MEDICINE

## 2018-01-04 RX ORDER — AMOXICILLIN AND CLAVULANATE POTASSIUM 875; 125 MG/1; MG/1
1 TABLET, FILM COATED ORAL 2 TIMES DAILY
Qty: 14 TAB | Refills: 0 | Status: SHIPPED | OUTPATIENT
Start: 2018-01-04 | End: 2018-01-11

## 2018-01-04 RX ORDER — ALBUTEROL SULFATE 90 UG/1
2 AEROSOL, METERED RESPIRATORY (INHALATION) EVERY 6 HOURS PRN
Qty: 8.5 G | Refills: 3 | Status: SHIPPED | OUTPATIENT
Start: 2018-01-04 | End: 2021-04-26

## 2018-01-04 RX ORDER — METHYLPREDNISOLONE 4 MG/1
TABLET ORAL
Qty: 21 TAB | Refills: 0 | Status: SHIPPED | OUTPATIENT
Start: 2018-01-04 | End: 2018-10-30

## 2018-01-04 ASSESSMENT — PAIN SCALES - GENERAL: PAINLEVEL: 2=MINIMAL-SLIGHT

## 2018-01-05 NOTE — PROGRESS NOTES
Subjective:   Amanda Whitmore is a 67 y.o. female here today for evaluation and management of:      Acute bronchitis due to infection  This is a new problem. Patient reported having productive cough, chest congestion and wheezing for one week. She reported that she has bronchitis last year and has similar symptoms. She tried to drink more water to decrease her symptoms. Her symptoms gradually worsened in past 3 days. She denied chest pain or shortness of breath or palpitation or dizziness. She reported fever at 101 to 102° Fahrenheit at home and fever resolved from taking NSAIDs.         Current medicines (including changes today)  Current Outpatient Prescriptions   Medication Sig Dispense Refill   • MethylPREDNISolone (MEDROL DOSEPAK) 4 MG Tablet Therapy Pack Take as instructed and take with food. 21 Tab 0   • amoxicillin-clavulanate (AUGMENTIN) 875-125 MG Tab Take 1 Tab by mouth 2 times a day for 7 days. 14 Tab 0   • albuterol 108 (90 Base) MCG/ACT Aero Soln inhalation aerosol Inhale 2 Puffs by mouth every 6 hours as needed. 8.5 g 3   • Glucos-Chond-Hyal Ac-Ca Fructo (MOVE FREE [facility] ADVANCE) Tab Take  by mouth every day.     • thyroid (ARMOUR THYROID) 120 MG Tab TAKE 1/2 TABLET ORALLY TWICE A DAY 90 Tab 4   • thyroid (ARMOUR THYROID) 90 MG Tab TAKE 1 TABLET BY MOUTH ONCE DAILY 90 Tab 4   • furosemide (LASIX) 20 MG Tab Take 1 Tab by mouth every day. 90 Tab 4   • liothyronine (CYTOMEL) 25 MCG Tab Take 1 Tab by mouth every day. 90 Tab 4   • trazodone (DESYREL) 150 MG Tab Take 1 Tab by mouth every bedtime. 30 Tab 11   • omeprazole (PRILOSEC) 20 MG delayed-release capsule Take 1 Cap by mouth every day. 30 Cap 11   • nitrofurantoin (FURADANTIN) 25 MG/5ML Suspension Take 10 mL by mouth 4 times a day. 400 mL 4   • atenolol (TENORMIN) 50 MG Tab Take 1 Tab by mouth every day. 90 Tab 4   • hydrocodone-acetaminophen (NORCO) 7.5-325 MG per tablet Take 1-2 Tabs by mouth every 6 hours as needed. 20 Tab 0     No  "current facility-administered medications for this visit.      She  has a past medical history of Anesthesia; Arthritis; Bronchitis (6/2014); Cancer (CMS-[facility]) (2015); Coughing blood; GERD (gastroesophageal reflux disease); Heart burn; Hiatus hernia syndrome; Hypertension; Indigestion; Pain (04/16/14); Pneumonia (12/2013); Renal disorder; Sleep apnea; Snoring; Thyroid disease; and Type II or unspecified type diabetes mellitus without mention of complication, not stated as uncontrolled.    ROS   No chest pain, no shortness of breath, no abdominal pain       Objective:     Blood pressure 126/78, pulse 92, temperature 36.4 °C (97.5 °F), resp. rate 16, height 1.613 m (5' 3.5\"), weight 76.7 kg (169 lb), SpO2 97 %, not currently breastfeeding. Body mass index is 29.47 kg/m².   Physical Exam:  General: Alert, oriented and no acute distress.  Eye contact is good, speech goal directed, affect calm  HEENT: conjunctiva non-injected, sclera non-icteric.  Oral mucous membranes pink and moist with no lesions.  Pinna normal.   Lungs: Normal respiratory effort, Mild expiratory wheezing and rhonchi noticed on exam.  CV: regular rate and rhythm. No murmurs. No carotid bruits.  Abdomen: soft, non distended, nontender, No CVAT, Bowel sound normal.  Ext: no edema, color normal, vascularity normal, temperature normal         Assessment and Plan:   The following treatment plan was discussed     1. Acute bronchitis due to infection  - No acute respiratory distress. Vital signs are stable.   - Prescribed Augmentin to take one tablet twice a day as instructed. Review potential side effects of Augmentin with patient. Advised to take probiotic while she is taking Augmentin.  - Prescribed Medrol Dosepak to take as instructed and take with food. Prescribed albuterol inhaler to use as instructed for wheezing, chest congestion or coughing spell.  - Advised to have chest x-ray to rule out pneumonia and evaluation of other underlying pathology. But " "patient declined to do chest x-ray. Patient stated \"I do not feel that my lungs are involved. This is Bronchitis what I had it last time\". I advised patient to call our clinic or return to clinic to reevaluate and to have chest x-ray if her symptoms do not improve with above treatment.  - Discussed ED precautions with patient: seek emergency evaluation for symptoms including but not limited to: worsening symptoms or developing any new symptoms, crushing chest pain, chest pain associated with difficulty breathing, nausea, or sweats, heart rate irregular or too fast to count.   - Any change or worsening of signs or symptoms, patient encouraged to follow-up or report to emergency room for further evaluation. Patient understands and agrees    - MethylPREDNISolone (MEDROL DOSEPAK) 4 MG Tablet Therapy Pack; Take as instructed and take with food.  Dispense: 21 Tab; Refill: 0  - amoxicillin-clavulanate (AUGMENTIN) 875-125 MG Tab; Take 1 Tab by mouth 2 times a day for 7 days.  Dispense: 14 Tab; Refill: 0  - albuterol 108 (90 Base) MCG/ACT Aero Soln inhalation aerosol; Inhale 2 Puffs by mouth every 6 hours as needed.  Dispense: 8.5 g; Refill: 3        Followup: Return if symptoms worsen or fail to improve.      Please note that this dictation was created using voice recognition software. I have made every reasonable attempt to correct obvious errors, but I expect that there may have unintended errors in text, spelling, punctuation, or grammar that I did not discover.           "

## 2018-01-05 NOTE — ASSESSMENT & PLAN NOTE
This is a new problem. Patient reported having productive cough, chest congestion and wheezing for one week. She reported that she has bronchitis last year and has similar symptoms. She tried to drink more water to decrease her symptoms. Her symptoms gradually worsened in past 3 days. She denied chest pain or shortness of breath or palpitation or dizziness. She reported fever at 101 to 102° Fahrenheit at home and fever resolved from taking NSAIDs.

## 2018-01-19 DIAGNOSIS — Z23 NEED FOR IMMUNIZATION AGAINST INFLUENZA: ICD-10-CM

## 2018-01-19 PROCEDURE — G0008 ADMIN INFLUENZA VIRUS VAC: HCPCS | Performed by: INTERNAL MEDICINE

## 2018-01-19 PROCEDURE — 90662 IIV NO PRSV INCREASED AG IM: CPT | Performed by: INTERNAL MEDICINE

## 2018-01-23 ENCOUNTER — TELEPHONE (OUTPATIENT)
Dept: MEDICAL GROUP | Age: 68
End: 2018-01-23

## 2018-01-23 NOTE — TELEPHONE ENCOUNTER
pt called stated she thinks she is allergic to something in the flu vaccine. Pt states her are was warm to the touch and red. Soreness radiated down her arm. Pt states she does not want to be seen only wants to know ingredients in the vaccine and listed in her chart that she is allergic to it.

## 2018-01-23 NOTE — TELEPHONE ENCOUNTER
that's a very typical reaction to the flu vaccine and very common. Local arm soreness and redness happens in about 50 % of pts. It goes away in 4-5 days. Apply icepacks to the site and take benadryl for the reaction. Not serious .  We will note your local reaction, but it is not a system allergic reaction.  It just means the vaccine is working, and should protect her.

## 2018-01-25 NOTE — TELEPHONE ENCOUNTER
Phone Number Called: [phone] (home)     Message: Pt notified of Dr. Salguero's message.    Left Message for patient to call back: no

## 2018-01-30 DIAGNOSIS — I10 ESSENTIAL HYPERTENSION: ICD-10-CM

## 2018-01-30 RX ORDER — ATENOLOL 50 MG/1
50 TABLET ORAL DAILY
Qty: 90 TAB | Refills: 4 | Status: SHIPPED | OUTPATIENT
Start: 2018-01-30 | End: 2018-10-30 | Stop reason: SDUPTHER

## 2018-02-27 ENCOUNTER — PATIENT OUTREACH (OUTPATIENT)
Dept: HEALTH INFORMATION MANAGEMENT | Facility: OTHER | Age: 68
End: 2018-02-27

## 2018-02-27 NOTE — PROGRESS NOTES
1. Attempt #: Final    2. HealthConnect Verified: yes    3. Verify PCP: yes    4. Care Team Updated:       •   DME Company (gait device, O2, CPAP, etc.): NO       •   Other Specialists (eye doctor, derm, GYN, cardiology, endo, etc): YES    5.  Reviewed/Updated the following with patient:       •   Communication Preference Obtained? YES       •   Preferred Pharmacy? YES       •   Preferred Lab? YES       •   Family History (document living status of immediate family members and if + hx of cancer, diabetes, hypertension, hyperlipidemia, heart attack, stroke) YES. Was Abstract Encounter opened and chart updated? YES    6. [employer] Activation: declined    7. [employer] Travis: no    8. Annual Wellness Visit Scheduling  Scheduling Status:Scheduled      9. Care Gap Scheduling (Attempt to Schedule EACH Overdue Care Gap!)     Health Maintenance Due   Topic Date Due   • Annual Wellness Visit  1950        Scheduled patient for Annual Wellness Visit    10. Patient was advised: “This is a free wellness visit. The provider will screen for medical conditions to help you stay healthy. If you have other concerns to address you may be asked to discuss these at a separate visit or there may be an additional fee.”     11. Patient was informed to arrive 15 min prior to their scheduled appointment and bring in their medication bottles.

## 2018-10-08 ENCOUNTER — PATIENT OUTREACH (OUTPATIENT)
Dept: HEALTH INFORMATION MANAGEMENT | Facility: OTHER | Age: 68
End: 2018-10-08

## 2018-10-08 NOTE — PROGRESS NOTES
Outcome: Pt already scheduled AWV. Ok per pt to add pneumococcal shot.   No willng to review anything else as per pt she did this already this year.     Please transfer to Patient Outreach Team at [phone] when patient returns call.    [employer] Inspection checked and Epic Updated: Yes    HealthConnect verified: yes  Attempt: 1

## 2018-10-09 ENCOUNTER — HOSPITAL ENCOUNTER (OUTPATIENT)
Facility: MEDICAL CENTER | Age: 68
End: 2018-10-09
Attending: INTERNAL MEDICINE
Payer: MEDICARE

## 2018-10-09 ENCOUNTER — HOSPITAL ENCOUNTER (OUTPATIENT)
Dept: LAB | Facility: MEDICAL CENTER | Age: 68
End: 2018-10-09
Attending: INTERNAL MEDICINE
Payer: MEDICARE

## 2018-10-09 DIAGNOSIS — E78.2 MIXED HYPERLIPIDEMIA: ICD-10-CM

## 2018-10-09 LAB
BASOPHILS # BLD AUTO: 2.1 % (ref 0–1.8)
BASOPHILS # BLD: 0.08 K/UL (ref 0–0.12)
EOSINOPHIL # BLD AUTO: 0.16 K/UL (ref 0–0.51)
EOSINOPHIL NFR BLD: 4.3 % (ref 0–6.9)
ERYTHROCYTE [DISTWIDTH] IN BLOOD BY AUTOMATED COUNT: 42.9 FL (ref 35.9–50)
HCT VFR BLD AUTO: 44.3 % (ref 37–47)
HGB BLD-MCNC: 14.2 G/DL (ref 12–16)
IMM GRANULOCYTES # BLD AUTO: 0 K/UL (ref 0–0.11)
IMM GRANULOCYTES NFR BLD AUTO: 0 % (ref 0–0.9)
LYMPHOCYTES # BLD AUTO: 1.21 K/UL (ref 1–4.8)
LYMPHOCYTES NFR BLD: 32.4 % (ref 22–41)
MCH RBC QN AUTO: 27.2 PG (ref 27–33)
MCHC RBC AUTO-ENTMCNC: 32.1 G/DL (ref 33.6–35)
MCV RBC AUTO: 84.7 FL (ref 81.4–97.8)
MONOCYTES # BLD AUTO: 0.36 K/UL (ref 0–0.85)
MONOCYTES NFR BLD AUTO: 9.7 % (ref 0–13.4)
NEUTROPHILS # BLD AUTO: 1.92 K/UL (ref 2–7.15)
NEUTROPHILS NFR BLD: 51.5 % (ref 44–72)
NRBC # BLD AUTO: 0 K/UL
NRBC BLD-RTO: 0 /100 WBC
PLATELET # BLD AUTO: 299 K/UL (ref 164–446)
PMV BLD AUTO: 10.7 FL (ref 9–12.9)
RBC # BLD AUTO: 5.23 M/UL (ref 4.2–5.4)
WBC # BLD AUTO: 3.7 K/UL (ref 4.8–10.8)

## 2018-10-09 PROCEDURE — 84443 ASSAY THYROID STIM HORMONE: CPT

## 2018-10-09 PROCEDURE — 85025 COMPLETE CBC W/AUTO DIFF WBC: CPT

## 2018-10-09 PROCEDURE — 82274 ASSAY TEST FOR BLOOD FECAL: CPT

## 2018-10-09 PROCEDURE — 80061 LIPID PANEL: CPT

## 2018-10-09 PROCEDURE — 80053 COMPREHEN METABOLIC PANEL: CPT

## 2018-10-09 PROCEDURE — 36415 COLL VENOUS BLD VENIPUNCTURE: CPT

## 2018-10-10 LAB
ALBUMIN SERPL BCP-MCNC: 3.9 G/DL (ref 3.2–4.9)
ALBUMIN/GLOB SERPL: 1.7 G/DL
ALP SERPL-CCNC: 125 U/L (ref 30–99)
ALT SERPL-CCNC: 14 U/L (ref 2–50)
ANION GAP SERPL CALC-SCNC: 7 MMOL/L (ref 0–11.9)
AST SERPL-CCNC: 17 U/L (ref 12–45)
BILIRUB SERPL-MCNC: 0.8 MG/DL (ref 0.1–1.5)
BUN SERPL-MCNC: 9 MG/DL (ref 8–22)
CALCIUM SERPL-MCNC: 9.7 MG/DL (ref 8.5–10.5)
CHLORIDE SERPL-SCNC: 108 MMOL/L (ref 96–112)
CHOLEST SERPL-MCNC: 190 MG/DL (ref 100–199)
CO2 SERPL-SCNC: 27 MMOL/L (ref 20–33)
CREAT SERPL-MCNC: 0.54 MG/DL (ref 0.5–1.4)
FASTING STATUS PATIENT QL REPORTED: NORMAL
GLOBULIN SER CALC-MCNC: 2.3 G/DL (ref 1.9–3.5)
GLUCOSE SERPL-MCNC: 112 MG/DL (ref 65–99)
HDLC SERPL-MCNC: 69 MG/DL
LDLC SERPL CALC-MCNC: 108 MG/DL
POTASSIUM SERPL-SCNC: 3.7 MMOL/L (ref 3.6–5.5)
PROT SERPL-MCNC: 6.2 G/DL (ref 6–8.2)
SODIUM SERPL-SCNC: 142 MMOL/L (ref 135–145)
TRIGL SERPL-MCNC: 65 MG/DL (ref 0–149)
TSH SERPL DL<=0.005 MIU/L-ACNC: <0.005 UIU/ML (ref 0.38–5.33)

## 2018-10-12 DIAGNOSIS — Z12.11 SCREEN FOR COLON CANCER: ICD-10-CM

## 2018-10-13 LAB — HEMOCCULT STL QL IA: NEGATIVE

## 2018-10-23 ENCOUNTER — TELEPHONE (OUTPATIENT)
Dept: MEDICAL GROUP | Age: 68
End: 2018-10-23

## 2018-10-23 NOTE — TELEPHONE ENCOUNTER
ANNUAL WELLNESS VISIT PRE-VISIT PLANNING WITHOUT OUTREACH    1.  Reviewed note from last office visit with PCP: YES    2.  If any orders were placed at last visit, do we have Results/Consult Notes?        •  Labs - Labs ordered, completed on 10/9 and results are in chart.  Note: If patient appointment is for lab review and patient did not complete labs, check with provider if OK to reschedule patient until labs completed.       •  Imaging - Imaging ordered, NOT completed. Patient advised to complete prior to next appointment.       •  Referrals - No referrals were ordered at last office visit.    3.  Immunizations were updated in Epic using WebIZ?: Epic matches WebIZ       •  WebIZ Recommendations: patient has allergies to immunizations        •  Is patient due for Tdap? YES. Patient was notified of copay/out of pocket cost.       •  Is patient due for Shingles? YES. Patient was notified of copay/out of pocket cost.     4.  Patient is due for the following Health Maintenance Topics:   Health Maintenance Due   Topic Date Due   • Annual Wellness Visit  1950   • IMM DTaP/Tdap/Td Vaccine (1 - Tdap) 12/07/1967   • IMM ZOSTER VACCINES (1 of 2) 12/07/1998   • IMM PNEUMOCOCCAL 65+ (ADULT) LOW/MEDIUM RISK SERIES (1 of 2 - PCV13) 12/07/2013   • IMM INFLUENZA (1) 09/01/2018           5.  Reviewed/Updated the following with patient:       •   Preferred Pharmacy? YES       •   Preferred Lab? YES       •   Preferred Communication? YES       •   Allergies? YES       •   Medications? YES. Was Abstract Encounter opened and chart updated? YES       •   Social History? YES. Was Abstract Encounter opened and chart updated? YES       •   Family History (document living status of immediate family members and if + hx of  cancer, diabetes, hypertension, hyperlipidemia, heart attack, stroke) YES. Was Abstract Encounter opened and chart updated? YES    6.  Care Team Updated:       •   DME Company (gait device, O2, CPAP, etc.): YES        •   Other Specialists (eye doctor, derm, GYN, cardiology, endo, etc): YES    7.  Patient has the following Care Path diagnoses on Problem List:      1.  Is patient under the care of a pulmonologist? No.  2.  Has patient ever completed a PFT or spirometry? No.  3.  Is patient on oxygen or CPAP? No.  4.  Has patient ever had instruction on inhaler technique by health care professional? No  5.  Is patient interested in a referral to respiratory therapy for more information on COPD, inhaler technique, and/or information on establishing an action plan?  No, patient is NOT interested.    NONE    8.  MDX printed and highlighted for Provider? YES    9.  Patient was advised: “This is a free wellness visit. The provider will screen for medical conditions to help you stay healthy. If you have other concerns to address you may be asked to discuss these at a separate visit or there may be an additional fee.”     10.  Patient was informed to arrive 15 min prior to their scheduled appointment and bring in their medication bottles.

## 2018-10-23 NOTE — TELEPHONE ENCOUNTER
Future Appointments       Provider Department Center    10/30/2018 9:00 AM Cricket Salguero M.D.; [facility] [address]                    Left message for patient to call back regarding pre-visit planning. Please transfer call to [phone].

## 2018-10-30 ENCOUNTER — OFFICE VISIT (OUTPATIENT)
Dept: MEDICAL GROUP | Age: 68
End: 2018-10-30
Payer: MEDICARE

## 2018-10-30 VITALS
DIASTOLIC BLOOD PRESSURE: 76 MMHG | OXYGEN SATURATION: 94 % | HEIGHT: 63 IN | HEART RATE: 71 BPM | WEIGHT: 166 LBS | BODY MASS INDEX: 29.41 KG/M2 | SYSTOLIC BLOOD PRESSURE: 132 MMHG | TEMPERATURE: 97.5 F

## 2018-10-30 DIAGNOSIS — M54.6 CHRONIC MIDLINE THORACIC BACK PAIN: ICD-10-CM

## 2018-10-30 DIAGNOSIS — I10 ESSENTIAL HYPERTENSION: ICD-10-CM

## 2018-10-30 DIAGNOSIS — Z91.81 RISK FOR FALLS: ICD-10-CM

## 2018-10-30 DIAGNOSIS — G47.33 OBSTRUCTIVE SLEEP APNEA SYNDROME: ICD-10-CM

## 2018-10-30 DIAGNOSIS — R73.01 IMPAIRED FASTING GLUCOSE: ICD-10-CM

## 2018-10-30 DIAGNOSIS — E55.9 HYPOVITAMINOSIS D: ICD-10-CM

## 2018-10-30 DIAGNOSIS — D33.3 BENIGN NEOPLASM OF CRANIAL NERVES (HCC): ICD-10-CM

## 2018-10-30 DIAGNOSIS — N39.0 RECURRENT UTI: ICD-10-CM

## 2018-10-30 DIAGNOSIS — M20.10 ACQUIRED HALLUX VALGUS, UNSPECIFIED LATERALITY: ICD-10-CM

## 2018-10-30 DIAGNOSIS — M16.0 PRIMARY OSTEOARTHRITIS OF BOTH HIPS: ICD-10-CM

## 2018-10-30 DIAGNOSIS — H53.2 DIPLOPIA: ICD-10-CM

## 2018-10-30 DIAGNOSIS — R60.9 EDEMA, UNSPECIFIED TYPE: ICD-10-CM

## 2018-10-30 DIAGNOSIS — E66.9 OBESITY (BMI 30.0-34.9): ICD-10-CM

## 2018-10-30 DIAGNOSIS — D32.9 MENINGIOMA OF RIGHT SPHENOID WING INVOLVING CAVERNOUS SINUS (HCC): ICD-10-CM

## 2018-10-30 DIAGNOSIS — E78.2 MIXED HYPERLIPIDEMIA: ICD-10-CM

## 2018-10-30 DIAGNOSIS — F51.01 PRIMARY INSOMNIA: ICD-10-CM

## 2018-10-30 DIAGNOSIS — J20.9 ACUTE BRONCHITIS DUE TO INFECTION: ICD-10-CM

## 2018-10-30 DIAGNOSIS — H49.21 SIXTH NERVE PALSY OF RIGHT EYE: ICD-10-CM

## 2018-10-30 DIAGNOSIS — K21.00 GASTROESOPHAGEAL REFLUX DISEASE WITH ESOPHAGITIS: ICD-10-CM

## 2018-10-30 DIAGNOSIS — G89.29 CHRONIC MIDLINE THORACIC BACK PAIN: ICD-10-CM

## 2018-10-30 DIAGNOSIS — M54.6 ACUTE MIDLINE THORACIC BACK PAIN: ICD-10-CM

## 2018-10-30 DIAGNOSIS — E03.4 HYPOTHYROIDISM DUE TO ACQUIRED ATROPHY OF THYROID: ICD-10-CM

## 2018-10-30 DIAGNOSIS — Z00.00 MEDICARE ANNUAL WELLNESS VISIT, SUBSEQUENT: ICD-10-CM

## 2018-10-30 DIAGNOSIS — M79.89 LEG SWELLING: ICD-10-CM

## 2018-10-30 PROBLEM — M54.50 CHRONIC MIDLINE LOW BACK PAIN WITHOUT SCIATICA: Status: RESOLVED | Noted: 2018-10-30 | Resolved: 2018-10-30

## 2018-10-30 PROBLEM — R07.81 RIB PAIN ON RIGHT SIDE: Status: RESOLVED | Noted: 2017-10-10 | Resolved: 2018-10-30

## 2018-10-30 PROBLEM — M54.50 CHRONIC MIDLINE LOW BACK PAIN WITHOUT SCIATICA: Status: ACTIVE | Noted: 2018-10-30

## 2018-10-30 PROCEDURE — G0439 PPPS, SUBSEQ VISIT: HCPCS | Mod: 25 | Performed by: INTERNAL MEDICINE

## 2018-10-30 PROCEDURE — 99214 OFFICE O/P EST MOD 30 MIN: CPT | Mod: 25 | Performed by: INTERNAL MEDICINE

## 2018-10-30 RX ORDER — ATENOLOL 50 MG/1
50 TABLET ORAL DAILY
Qty: 90 TAB | Refills: 4 | Status: SHIPPED | OUTPATIENT
Start: 2018-10-30 | End: 2020-01-21

## 2018-10-30 RX ORDER — TRAZODONE HYDROCHLORIDE 150 MG/1
150 TABLET ORAL
Qty: 90 TAB | Refills: 4
Start: 2018-10-30 | End: 2019-10-30 | Stop reason: SDUPTHER

## 2018-10-30 RX ORDER — ACETAMINOPHEN 500 MG
500-1000 TABLET ORAL EVERY 6 HOURS PRN
COMMUNITY

## 2018-10-30 RX ORDER — LIOTHYRONINE SODIUM 25 UG/1
25 TABLET ORAL DAILY
Qty: 90 TAB | Refills: 4 | Status: SHIPPED | OUTPATIENT
Start: 2018-10-30 | End: 2020-01-21 | Stop reason: SDUPTHER

## 2018-10-30 RX ORDER — THYROID 90 MG/1
TABLET ORAL
Qty: 90 TAB | Refills: 4 | Status: SHIPPED | OUTPATIENT
Start: 2018-10-30 | End: 2020-01-21 | Stop reason: SDUPTHER

## 2018-10-30 RX ORDER — NITROFURANTOIN MACROCRYSTALS 50 MG/1
50 CAPSULE ORAL 4 TIMES DAILY
Qty: 360 CAP | Refills: 4 | Status: SHIPPED | OUTPATIENT
Start: 2018-10-30 | End: 2019-10-30

## 2018-10-30 RX ORDER — THYROID 120 MG/1
TABLET ORAL
Qty: 90 TAB | Refills: 4 | Status: SHIPPED | OUTPATIENT
Start: 2018-10-30 | End: 2020-01-21 | Stop reason: SDUPTHER

## 2018-10-30 RX ORDER — FUROSEMIDE 20 MG/1
20 TABLET ORAL DAILY
Qty: 90 TAB | Refills: 4 | Status: SHIPPED | OUTPATIENT
Start: 2018-10-30 | End: 2019-09-05

## 2018-10-30 ASSESSMENT — ENCOUNTER SYMPTOMS: GENERAL WELL-BEING: GOOD

## 2018-10-30 ASSESSMENT — PATIENT HEALTH QUESTIONNAIRE - PHQ9: CLINICAL INTERPRETATION OF PHQ2 SCORE: 0

## 2018-10-30 ASSESSMENT — ACTIVITIES OF DAILY LIVING (ADL): BATHING_REQUIRES_ASSISTANCE: 0

## 2018-10-30 NOTE — PROGRESS NOTES
Chief Complaint   Patient presents with   • Annual Wellness Visit         HPI:  Amanda Carr is a 67 y.o. 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 (CMS-HCC)- RT 2015, dr russo; no change in mri 2017 10/10/2017   • Acute midline thoracic back pain- r/o compression rx thoracic and lumbar spine 10/10/2017   • Obesity (BMI 30.0-34.9) 10/10/2017   • Acquired hallux valgus 12/15/2016   • Diplopia- dr gould and dr hernandez 10/02/2015   • Primary osteoarthritis of both hips- dr browne; also rx's  Norco 10/02/2015   • Impaired fasting glucose 04/29/2013   • Leg swelling-right, chronic- SUPPORT HOSE 10/24/2012   • Essential hypertension 08/23/2011   • Mixed hyperlipidemia 08/23/2011   • Edema 08/23/2011   • Hypothyroidism due to acquired atrophy of thyroid 08/23/2011   • Hypovitaminosis D 08/23/2011   • Insomnia 08/23/2011   • Sleep apnea-  NASAL STRIPS; NO CPAP 08/23/2011   • GERD (gastroesophageal reflux disease) 08/23/2011       Current Outpatient Prescriptions   Medication Sig Dispense Refill   • acetaminophen (TYLENOL) 500 MG Tab Take 500-1,000 mg by mouth every 6 hours as needed.     • atenolol (TENORMIN) 50 MG Tab Take 1 Tab by mouth every day. 90 Tab 4   • albuterol 108 (90 Base) MCG/ACT Aero Soln inhalation aerosol Inhale 2 Puffs by mouth every 6 hours as needed. 8.5 g 3   • Glucos-Chond-Hyal Ac-Ca Fructo (MOVE FREE JOINT HEALTH ADVANCE) Tab Take  by mouth every day.     • thyroid (ARMOUR THYROID) 120 MG Tab TAKE 1/2 TABLET ORALLY TWICE A DAY 90 Tab 4   • thyroid (ARMOUR THYROID) 90 MG Tab TAKE 1 TABLET BY MOUTH ONCE DAILY 90 Tab 4   • furosemide (LASIX) 20 MG Tab Take 1 Tab by mouth every day. 90 Tab 4   • liothyronine (CYTOMEL) 25 MCG Tab Take 1 Tab by mouth  every day. 90 Tab 4   • trazodone (DESYREL) 150 MG Tab Take 1 Tab by mouth every bedtime. 30 Tab 11   • nitrofurantoin (FURADANTIN) 25 MG/5ML Suspension Take 10 mL by mouth 4 times a day. 400 mL 4   • MethylPREDNISolone (MEDROL DOSEPAK) 4 MG Tablet Therapy Pack Take as instructed and take with food. (Patient not taking: Reported on 10/23/2018) 21 Tab 0   • omeprazole (PRILOSEC) 20 MG delayed-release capsule Take 1 Cap by mouth every day. (Patient not taking: Reported on 10/30/2018) 30 Cap 11   • hydrocodone-acetaminophen (NORCO) 7.5-325 MG per tablet Take 1-2 Tabs by mouth every 6 hours as needed. (Patient not taking: Reported on 10/23/2018) 20 Tab 0     No current facility-administered medications for this visit.         Patient is taking medications as noted in medication list.  Current supplements as per medication list.     Allergies: Alcohol; Asa [aspirin]; Bee; Iodine; Shellfish allergy; Sulfa drugs; Tetanus antitoxin; Tape; Influenza vaccines; Other environmental; and Synthroid [levothroid]    Current social contact/activities: go to dinner/casino        Is patient current with immunizations? No, due for FLU, PREVNAR (PCV13)  and TDAP. Patient is interested in receiving NONE today.    She  reports that she quit smoking about 53 years ago. Her smoking use included Cigarettes. She has a 4.00 pack-year smoking history. She has never used smokeless tobacco. She reports that she does not drink alcohol or use drugs.  Counseling given: Not Answered        DPA/Advanced directive: Patient has Advanced Directive on file.     ROS:    Gait: Uses no assistive device        Ostomy: No    Other tubes: No     Amputations: No    Chronic oxygen use No    Last eye exam 06/17    Wears hearing aids: No     : Reports urinary leakage during the last 6 months that has not interfered at all with their daily activities or sleep.       Screening:         Depression Screening    Little interest or pleasure in doing things?  0 - not at  all  Feeling down, depressed, or hopeless? 0 - not at all  Patient Health Questionnaire Score: 0    If depressive symptoms identified deferred to follow up visit unless specifically addressed in assessment and plan.    Interpretation of PHQ-9 Total Score   Score Severity   1-4 No Depression   5-9 Mild Depression   10-14 Moderate Depression   15-19 Moderately Severe Depression   20-27 Severe Depression    Screening for Cognitive Impairment    Three Minute Recall (leader, season, table)  3/3    Elvis clock face with all 12 numbers and set the hands to show 10 past 11.  Yes    If cognitive concerns identified, deferred for follow up unless specifically addressed in assessment and plan.    Fall Risk Assessment    Has the patient had two or more falls in the last year or any fall with injury in the last year?  Yes  If fall risk identified, deferred for follow up unless specifically addressed in assessment and plan.    Safety Assessment    Throw rugs on floor.  Yes  Handrails on all stairs.  No  Good lighting in all hallways.  Yes  Difficulty hearing.  No  Patient counseled about all safety risks that were identified.    Functional Assessment ADLs    Are there any barriers preventing you from cooking for yourself or meeting nutritional needs?  No.    Are there any barriers preventing you from driving safely or obtaining transportation?  No.    Are there any barriers preventing you from using a telephone or calling for help?  No.    Are there any barriers preventing you from shopping?  No.    Are there any barriers preventing you from taking care of your own finances?  No.    Are there any barriers preventing you from managing your medications?  No.    Are there any barriers preventing you from showering, bathing or dressing yourself?  No.    Are you currently engaging in any exercise or physical activity?  Yes.  walk  What is your perception of your health?  Good.    Health Maintenance Summary                Annual  Wellness Visit Overdue 1950     IMM DTaP/Tdap/Td Vaccine Overdue 12/7/1967     IMM ZOSTER VACCINES Overdue 12/7/1998     IMM PNEUMOCOCCAL 65+ (ADULT) LOW/MEDIUM RISK SERIES Overdue 12/7/2013     IMM INFLUENZA Overdue 9/1/2018      Done 1/19/2018 Imm Admin: Influenza Vaccine Adult HD    COLON CANCER SCREENING ANNUAL FIT Next Due 10/9/2019      Done 10/9/2018 OCCULT BLOOD FECES IMMUNOASSAY     Patient has more history with this topic...    BONE DENSITY Next Due 4/13/2020      Done 4/13/2015 DS-BONE DENSITY STUDY (DEXA)     Patient has more history with this topic...          Patient Care Team:  Cricket Salguero M.D. as PCP - General  Reid Dinero D.O. as Consulting Physician (Neurology)  Shankar Matos M.D. as Consulting Physician (Otolaryngology)  Frank Robertson M.D. as Consulting Physician (Orthopaedics)  Dr. Kasi Velasquez as Medical Home Care Manager (Ophthalmology)  Cricket Olguin M.D. (Radiation Oncology)    Social History   Substance Use Topics   • Smoking status: Former Smoker     Packs/day: 1.00     Years: 4.00     Types: Cigarettes     Quit date: 1/1/1965   • Smokeless tobacco: Never Used   • Alcohol use No      Comment: ALLERGIC TO IT     Family History   Problem Relation Age of Onset   • Heart Attack Mother    • Cancer Father    • Hypertension Father    • Stroke Father      She  has a past medical history of Anesthesia; Arthritis; Bronchitis (6/2014); Cancer (HCC) (2015); Coughing blood; GERD (gastroesophageal reflux disease); Heart burn; Hiatus hernia syndrome; Hypertension; Indigestion; Pain (04/16/14); Pneumonia (12/2013); Renal disorder; Sleep apnea; Snoring; Thyroid disease; and Type II or unspecified type diabetes mellitus without mention of complication, not stated as uncontrolled.   Past Surgical History:   Procedure Laterality Date   • BUNIONECTOMY DIXIE Right 12/15/2016    Procedure: BUNIONECTKLAI TORRES;  Surgeon: KELLY Deutsch.P.MMadison;  Location: SURGERY [facility]  [facility];  Service:    • JOINT INJECTION DIAGNOSTIC  4/16/2015    Performed by Frank Robertson M.D. at SURGERY [facility]   • JOINT INJECTION DIAGNOSTIC  8/28/2014    Performed by Frank Robertson M.D. at SURGERY [facility]   • JOINT INJECTION DIAGNOSTIC  4/17/2014    Performed by Frank Robertson M.D. at SURGERY [facility]   • CHOLECYSTECTOMY  1998   • ABDOMINAL HYSTERECTOMY TOTAL  1980's   • TONSILLECTOMY      as a child            Exam:     not currently breastfeeding. There is no height or weight on file to calculate BMI.    Hearing excellent.    Dentition good  Alert, oriented in no acute distress.  Eye contact is good, speech goal directed, affect calm       Assessment and Plan. The following treatment and monitoring plan is recommended:      1. Medicare annual wellness visit, subsequent     - Annual Wellness Visit - Includes PPPS Subsequent ()    2. Essential hypertension    Under good control. Continue same regimen.    - atenolol (TENORMIN) 50 MG Tab; Take 1 Tab by mouth every day.  Dispense: 90 Tab; Refill: 4  - Annual Wellness Visit - Includes PPPS Subsequent ()    3. Mixed hyperlipidemia    Under good control. Continue same regimen.  - COMP METABOLIC PANEL; Future  - LIPID PROFILE; Future  - CBC WITH DIFFERENTIAL; Future  - Annual Wellness Visit - Includes PPPS Subsequent ()    4. Edema, unspecified type    Under good control. Continue same regimen.  - furosemide (LASIX) 20 MG Tab; Take 1 Tab by mouth every day.  Dispense: 90 Tab; Refill: 4  - Annual Wellness Visit - Includes PPPS Subsequent ()    5. Hypothyroidism due to acquired atrophy of thyroid-this is severely overtreated.  Patient refuses to reduce her dosage of thyroxine and liothyronine.  We will recheck her levels to make sure she is not getting a toxic dose.  Her TSH is severely suppressed from a twice normal dosage of thyroxine and she is on at least twice the usual dose of liothyronine Cytomel.      - T3  FREE; Future  - TSH; Future  - FREE THYROXINE; Future  - thyroid (ARMOUR THYROID) 120 MG Tab; TAKE 1/2 TABLET ORALLY TWICE A DAY  Dispense: 90 Tab; Refill: 4  - thyroid (ARMOUR THYROID) 90 MG Tab; TAKE 1 TABLET BY MOUTH ONCE DAILY  Dispense: 90 Tab; Refill: 4  - liothyronine (CYTOMEL) 25 MCG Tab; Take 1 Tab by mouth every day.  Dispense: 90 Tab; Refill: 4  - Annual Wellness Visit - Includes PPPS Subsequent ()    6. Hypovitaminosis D Under good control. Continue same regimen.   - VITAMIN D,25 HYDROXY; Future  - Annual Wellness Visit - Includes PPPS Subsequent ()    7. Primary insomnia Under good control. Continue same regimen.   - traZODone (DESYREL) 150 MG Tab; Take 1 Tab by mouth every bedtime.  Dispense: 90 Tab; Refill: 4  - Annual Wellness Visit - Includes PPPS Subsequent ()    8. Obstructive sleep apnea syndrome Under good control. Continue same regimen.     - Annual Wellness Visit - Includes PPPS Subsequent ()    9. Gastroesophageal reflux disease with esophagitis    Under good control. Continue same regimen.  - Annual Wellness Visit - Includes PPPS Subsequent ()    10. Leg swelling-right, chronic- SUPPORT HOSE     Under good control. Continue same regimen.  - Annual Wellness Visit - Includes PPPS Subsequent ()    11. Impaired fasting glucose    Under good control. Continue same regimen.  - HEMOGLOBIN A1C; Future  - Annual Wellness Visit - Includes PPPS Subsequent ()    12. Diplopia- dr gould and dr hernandez    Under good control. Continue same regimen.  - Annual Wellness Visit - Includes PPPS Subsequent ()    13. Primary osteoarthritis of both hips- dr browne; also rx's  Norco    Under good control. Continue same regimen.  - acetaminophen (TYLENOL) 500 MG Tab; Take 500-1,000 mg by mouth every 6 hours as needed.  - Annual Wellness Visit - Includes PPPS Subsequent ()    14. Acquired hallux valgus, unspecified laterality     Under good control. Continue same regimen.  -  Annual Wellness Visit - Includes PPPS Subsequent ()    15. 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 Continue same regimen.  - Annual Wellness Visit - Includes PPPS Subsequent ()    16. Acute midline thoracic back pain- r/o compression rx thoracic and lumbar spine    Under good control. Continue same regimen.  - Annual Wellness Visit - Includes PPPS Subsequent ()    17. Obesity (BMI 30.0-34.9)    diet/exercise/lose 15 lbs.; patient counseled    - Annual Wellness Visit - Includes PPPS Subsequent ()    18. Risk for falls     Under good control. Continue same regimen.  - Patient identified as fall risk.  Appropriate orders and counseling given.  - Annual Wellness Visit - Includes PPPS Subsequent ()    19. 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 Continue same regimen.  - Annual Wellness Visit - Includes PPPS Subsequent ()    20. Acute bronchitis due to infection- reolved     - Annual Wellness Visit - Includes PPPS Subsequent ()    21. Recurrent UTI     Under good control. Continue same regimen.- nitrofurantoin (MACRODANTIN) 50 MG Cap; Take 1 Cap by mouth 4 times a day.  Dispense: 360 Cap; Refill: 4  - Annual Wellness Visit - Includes PPPS Subsequent ()    22. Chronic midline thoracic back pain- new recurrent prob; old comp fx t- spine; reinjured in fall 6 mos ago- still has pain     - REFERRAL TO PHYSIATRY (PMR)  - Annual Wellness Visit - Includes PPPS Subsequent ()    23. 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 Continue same regimen.    - Annual Wellness Visit - Includes PPPS Subsequent ()    Diagnosis of type 2 diabetes without complications, E 11.9 was removed from problem list.  Patient has impaired fasting glucose and no longer has diabetes.    Services suggested: No  services needed at this time  Health Care Screening recommendations as per orders if indicated.  Referrals offered: PT/OT/Nutrition counseling/Behavioral Health/Smoking cessation as per orders if indicated.    Discussion today about general wellness and lifestyle habits:    · Prevent falls and reduce trip hazards; Cautioned about securing or removing rugs.  · Have a working fire alarm and carbon monoxide detector;   · Engage in regular physical activity and social activities       Follow-up: No Follow-up on file.

## 2018-11-01 ENCOUNTER — TELEPHONE (OUTPATIENT)
Dept: RADIATION ONCOLOGY | Facility: MEDICAL CENTER | Age: 68
End: 2018-11-01

## 2018-11-01 DIAGNOSIS — Z87.898 PERSONAL HISTORY OF THYMOMA: ICD-10-CM

## 2018-11-01 DIAGNOSIS — D32.0 BENIGN NEOPLASM OF CEREBRAL MENINGES (HCC): ICD-10-CM

## 2018-11-20 ENCOUNTER — HOSPITAL ENCOUNTER (OUTPATIENT)
Dept: RADIOLOGY | Facility: MEDICAL CENTER | Age: 68
End: 2018-11-20
Attending: RADIOLOGY
Payer: MEDICARE

## 2018-11-20 DIAGNOSIS — D32.0 BENIGN NEOPLASM OF CEREBRAL MENINGES (HCC): ICD-10-CM

## 2018-11-20 PROCEDURE — 700117 HCHG RX CONTRAST REV CODE 255: Performed by: RADIOLOGY

## 2018-11-20 PROCEDURE — 70553 MRI BRAIN STEM W/O & W/DYE: CPT

## 2018-11-20 PROCEDURE — A9585 GADOBUTROL INJECTION: HCPCS | Performed by: RADIOLOGY

## 2018-11-20 RX ORDER — GADOBUTROL 604.72 MG/ML
7.5 INJECTION INTRAVENOUS ONCE
Status: COMPLETED | OUTPATIENT
Start: 2018-11-20 | End: 2018-11-20

## 2018-11-20 RX ADMIN — GADOBUTROL 7.5 ML: 604.72 INJECTION INTRAVENOUS at 09:22

## 2018-12-03 ENCOUNTER — HOSPITAL ENCOUNTER (OUTPATIENT)
Dept: RADIOLOGY | Facility: MEDICAL CENTER | Age: 68
End: 2018-12-03
Attending: PHYSICAL MEDICINE & REHABILITATION
Payer: MEDICARE

## 2018-12-03 ENCOUNTER — TELEPHONE (OUTPATIENT)
Dept: MEDICAL GROUP | Age: 68
End: 2018-12-03

## 2018-12-03 ENCOUNTER — OFFICE VISIT (OUTPATIENT)
Dept: PHYSICAL MEDICINE AND REHAB | Facility: MEDICAL CENTER | Age: 68
End: 2018-12-03
Payer: MEDICARE

## 2018-12-03 VITALS
DIASTOLIC BLOOD PRESSURE: 82 MMHG | HEART RATE: 78 BPM | HEIGHT: 63 IN | BODY MASS INDEX: 29.06 KG/M2 | SYSTOLIC BLOOD PRESSURE: 116 MMHG | OXYGEN SATURATION: 95 % | WEIGHT: 164 LBS | TEMPERATURE: 97.6 F

## 2018-12-03 DIAGNOSIS — G89.29 CHRONIC BILATERAL THORACIC BACK PAIN: ICD-10-CM

## 2018-12-03 DIAGNOSIS — R73.01 IMPAIRED FASTING GLUCOSE: ICD-10-CM

## 2018-12-03 DIAGNOSIS — M16.0 BILATERAL HIP JOINT ARTHRITIS: ICD-10-CM

## 2018-12-03 DIAGNOSIS — E11.9 TYPE 2 DIABETES MELLITUS WITHOUT COMPLICATION, UNSPECIFIED WHETHER LONG TERM INSULIN USE (HCC): ICD-10-CM

## 2018-12-03 DIAGNOSIS — M47.816 LUMBAR SPONDYLOSIS: ICD-10-CM

## 2018-12-03 DIAGNOSIS — M54.6 CHRONIC BILATERAL THORACIC BACK PAIN: ICD-10-CM

## 2018-12-03 DIAGNOSIS — M79.10 MYALGIA: ICD-10-CM

## 2018-12-03 PROCEDURE — 72070 X-RAY EXAM THORAC SPINE 2VWS: CPT

## 2018-12-03 PROCEDURE — 73522 X-RAY EXAM HIPS BI 3-4 VIEWS: CPT

## 2018-12-03 PROCEDURE — 99214 OFFICE O/P EST MOD 30 MIN: CPT | Performed by: PHYSICAL MEDICINE & REHABILITATION

## 2018-12-03 ASSESSMENT — PAIN SCALES - GENERAL: PAINLEVEL: 8=MODERATE-SEVERE PAIN

## 2018-12-03 ASSESSMENT — PATIENT HEALTH QUESTIONNAIRE - PHQ9: CLINICAL INTERPRETATION OF PHQ2 SCORE: 0

## 2018-12-03 NOTE — TELEPHONE ENCOUNTER
MEDICATION PRIOR AUTHORIZATION NEEDED:    1. Name of Medication: thyroid    2. Requested By (Name of Pharmacy): [facility]     3. Is insurance on file current? yes    4. What is the name & phone number of the 3rd party payor? [phone]

## 2018-12-03 NOTE — PROGRESS NOTES
New patient note    Physiatry (physical medicine and  Rehabilitation), interventional spine and sports medicine    Date of Service: 12/3/2018    Chief complaint:   Chief Complaint   Patient presents with   • New Patient     back pain        HISTORY    HPI: Amanda Whitmore 67 y.o. female who presents today with Diagnoses of Impaired fasting glucose, Type 2 diabetes mellitus without complication, unspecified whether long term insulin use (HCC), Lumbar spondylosis, Chronic bilateral thoracic back pain, Myalgia, and Bilateral hip joint arthritis were pertinent to this visit.    The patient is having chronic midthoracic back pain for the past year.  She does note a fall in October 2017, x-rays were normal at that time.  The patient states she had another fall in approximately June 2018 while she stepped into a slippery bathtub trying to close a window.  No falls at ground level.  At that time the thoracic back pain had worsened however she has not had an evaluation for this.  Pain is nonradiating, moderate to severe in intensity, aching, chronic, constant, worse with palpation, worse with movement.    Patient also has chronic bilateral hip arthritis and was receiving a intra-articular hip injection with significant pain relief for approximately 6 months however the last injection was January 2018.  Aching pain in the bilateral hips, worse with walking, right worse than left, groin pain, nonradiating, aching.  Pain is sharp at times.    Opioid Risk Score: 0    Interpretation of Opioid Risk Score   Score 0-3 = Low risk of abuse. Do UDS at least once per year.  Score 4-7 = Moderate risk of abuse. Do UDS 1-4 times per year.  Score 8+ = High risk of abuse. Refer to specialist.    PHQ  Depression Screen (PHQ-2/PHQ-9) 10/30/2017 10/30/2018 12/3/2018   PHQ-2 Total Score - - -   PHQ-2 Total Score 0 0 0       Interpretation of PHQ-9 Total Score   Score Severity   1-4 No Depression   5-9 Mild Depression   10-14 Moderate  Depression   15-19 Moderately Severe Depression   20-27 Severe Depression     Medical records review:  I reviewed the procedure notes from Dr. Robertson From 4/16/2015, 8/28/2014, 4/17/2014.  Intra-articular hip injections done with an 18-gauge 3-1/2 inch spinal needle with Depo-Medrol.    ROS:   History of chronic ringing in the years, nothing acute.  History of gastric ulcers but no acute pain.  Red Flags ROS:   Fever, Chills, Sweats: Denies  Involuntary Weight Loss: Denies  Bladder Incontinence: Denies  Bowel Incontinence: denies  Saddle Anesthesia: Denies    All other systems reviewed and negative.       PMHx:   Past Medical History:   Diagnosis Date   • Anesthesia     vertigo, PONV has trouble clearing anesthesia secondary to decreased kidney function   • Arthritis     hips, hands, knees   • Bronchitis 6/2014   • Cancer (HCC) 2015    brain, radiation March 2015   • Coughing blood    • GERD (gastroesophageal reflux disease)     on med   • Heart burn    • Hiatus hernia syndrome    • Hypertension    • Indigestion    • Pain 04/16/14    bilateral hips 4/10   • Pneumonia 12/2013   • Renal disorder     chronic insufficiency 40-80% fx (per pt; most recent BUN/Creat were WNL); gets kidney infections easily   • Sleep apnea     uses breath right strips   • Snoring    • Thyroid disease    • Type II or unspecified type diabetes mellitus without mention of complication, not stated as uncontrolled     Diet controlled       PSHx:   Past Surgical History:   Procedure Laterality Date   • BUNIONECTOMY DIXIE Right 12/15/2016    Procedure: BUNIONECTOMY DIXIE - BRIAN;  Surgeon: DAVIDA DeutschPALMAZ;  Location: SURGERY [facility];  Service:    • JOINT INJECTION DIAGNOSTIC  4/16/2015    Performed by Frank Robertson M.D. at SURGERY [facility]   • JOINT INJECTION DIAGNOSTIC  8/28/2014    Performed by Frank Robertson M.D. at [facility]   • JOINT INJECTION DIAGNOSTIC  4/17/2014    Performed by Frank Robertson  "M.D. at SURGERY [facility]   • CHOLECYSTECTOMY  1998   • ABDOMINAL HYSTERECTOMY TOTAL  1980's   • TONSILLECTOMY      as a child       Family history   Family History   Problem Relation Age of Onset   • Heart Attack Mother    • Cancer Father    • Hypertension Father    • Stroke Father          Medications: reviewed on epic.   Outpatient Prescriptions Marked as Taking for the 12/3/18 encounter (Office Visit) with Donal Ball M.D.   Medication Sig Dispense Refill   • atenolol (TENORMIN) 50 MG Tab Take 1 Tab by mouth every day. 90 Tab 4   • furosemide (LASIX) 20 MG Tab Take 1 Tab by mouth every day. 90 Tab 4   • thyroid (ARMOUR THYROID) 120 MG Tab TAKE 1/2 TABLET ORALLY TWICE A DAY 90 Tab 4   • thyroid (ARMOUR THYROID) 90 MG Tab TAKE 1 TABLET BY MOUTH ONCE DAILY 90 Tab 4   • traZODone (DESYREL) 150 MG Tab Take 1 Tab by mouth every bedtime. 90 Tab 4   • liothyronine (CYTOMEL) 25 MCG Tab Take 1 Tab by mouth every day. 90 Tab 4        Allergies:   Allergies   Allergen Reactions   • Alcohol Anaphylaxis     ETOH   • Asa [Aspirin] Anaphylaxis   • Bee Anaphylaxis   • Iodine Hives and Shortness of Breath     Contrast; \"will black out\"   • Shellfish Allergy Anaphylaxis   • Sulfa Drugs Anaphylaxis     \"will kill me\"   • Tetanus Antitoxin Nausea and Swelling     \"Swelling and violently ill\"; increased temp!!!!!!   • Tape Contact Dermatitis     Paper tape ok; will get welts and peeling skin with regular tape   • Influenza Vaccines Unspecified     Local reaction with deltoid pain swelling and swelling   • Other Environmental      Pt states that she is allergic \"to the sun\"   • Synthroid [Levothroid]      Nausea fatigue malaise       Social Hx:   Social History     Social History   • Marital status:      Spouse name: N/A   • Number of children: N/A   • Years of education: N/A     Occupational History   • Not on file.     Social History Main Topics   • Smoking status: Former Smoker     Packs/day: 1.00     Years: 4.00 " "    Types: Cigarettes     Quit date: 1/1/1965   • Smokeless tobacco: Never Used   • Alcohol use No      Comment: ALLERGIC TO IT   • Drug use: No   • Sexual activity: Not Currently     Other Topics Concern   •  Service No   • Blood Transfusions No   • Caffeine Concern No   • Occupational Exposure No   • Hobby Hazards No   • Sleep Concern Yes   • Stress Concern No   • Weight Concern No   • Special Diet No   • Back Care No   • Exercise No   • Bike Helmet No   • Seat Belt Yes   • Self-Exams Yes     Social History Narrative   • No narrative on file         EXAMINATION     Physical Exam:   Vitals: Blood pressure 116/82, pulse 78, temperature 36.4 °C (97.6 °F), temperature source Temporal, height 1.6 m (5' 3\"), weight 74.4 kg (164 lb), SpO2 95 %, not currently breastfeeding.    Constitutional:   Body Habitus: Body mass index is 29.05 kg/m².  Cooperation: Fully cooperates with exam  Appearance: Well-groomed, well-nourished, not disheveled     Eyes: No scleral icterus to suggest severe liver disease, no proptosis to suggest severe hyperthyroid    ENT -no obvious auditory deficits, no obvious tongue lesions, tongue midline, no facial droop     Skin -no rashes or lesions noted     Respiratory-  breathing comfortable on room air, no audible wheezing    Cardiovascular- capillary refills less than 2 seconds. No lower extremity edema is noted.     Gastrointestinal - no obvious abdominal masses, No tenderness to palpation in the abdomen    Psychiatric- alert and oriented ×3. Normal affect.     Gait -short strides, appears to have pain in the bilateral hips right worse than left.  Balance is appropriate.    Musculoskeletal -   Thoracic/Lumbar Spine/Sacral Spine/Hips   Inspection: No evidence of atrophy in bilateral lower extremities throughout     ROM: full  AROM with flexion, extension, lateral flexion, and rotation bilaterally, without pain     Palpation:   Tenderness palpation mid thoracic spinous processes and thoracic " paraspinous muscles.  No tenderness percussion.  No tenderness palpation of the lumbar spine or lumbar paraspinous muscles.  palpation over SI joint: negative bilaterally    palpation over buttock: negative bilaterally    palpation in hip or over the greater trochanters: negative bilaterally      Lumbar spine Special tests  Neuro tension  Straight leg test negative bilaterally    Slump test negative bilaterally      HIP  FAIR test positive bilaterally    Range of motion in the hips is within limited in flexion, extension, abduction, internal rotation, external rotation.    SI joint tests  Observation patient sits on one buttocks: Negative  SI joint compression negative bilaterally    SI joint distraction negative bilaterally    Thigh thrust test negative bilaterally    ANDERS test negative bilaterally       Neuro       Key points for the international standards for neurological classification of spinal cord injury (ISNCSCI) to light touch.     Dermatome R L   C4 2 2   C5 2 2   C6 2 2   C7 2 2   C8 2 2   T1 2 2   T2 2 2   L2 2 2   L3 2 2   L4 2 2   L5 2 2   S1 2 2   S2 2 2           Motor Exam Upper Extremities   ? Myotome R L   Shoulder flexion C5 5 5   Elbow flexion C5 5 5   Wrist extension C6 5 5   Elbow extension C7 5 5   Finger flexion C8 5 5   Finger abduction T1 5 5         Motor Exam Lower Extremities    ? Myotome R L   Hip flexion L2 5 5   Knee extension L3 5 5   Ankle dorsiflexion L4 5 5   Toe extension L5 5 5   Ankle plantarflexion S1 5 5           Tone on Modified Bashir Scale    R L  R L   Shoulder Adduction Negative  Negative  Hip Flexion Negative  Negative    Elbow Flexion Negative  Negative  Hip Extension Negative  Negative    Elbow Extension Negative  Negative  Hip Adduction Negative  Negative    Wrist Flexion Negative  Negative  Knee Extension Negative  Negative    Finger Flexion Negative  Negative  Knee Flexion Negative  Negative       Dorsiflexion Negative  Negative       Plantar Flexion Negative   Negative      Jackson’s sign negative bilaterally   Babinski sign negative bilaterally   Clonus of the ankle negative bilaterally     Reflexes  ?  R L   Biceps  2+ 2+   Brachioradialis  2+ 2+   Patella  2+ 2+   Achilles   2+ 2+           MEDICAL DECISION MAKING    Medical records review: see under HPI section.     DATA    Labs:   Lab Results   Component Value Date/Time    SODIUM 142 10/09/2018 06:24 AM    POTASSIUM 3.7 10/09/2018 06:24 AM    CHLORIDE 108 10/09/2018 06:24 AM    CO2 27 10/09/2018 06:24 AM    ANION 7.0 10/09/2018 06:24 AM    GLUCOSE 112 (H) 10/09/2018 06:24 AM    BUN 9 10/09/2018 06:24 AM    CREATININE 0.54 10/09/2018 06:24 AM    CREATININE 0.63 01/31/2013 08:06 AM    CALCIUM 9.7 10/09/2018 06:24 AM    ASTSGOT 17 10/09/2018 06:24 AM    ALTSGPT 14 10/09/2018 06:24 AM    TBILIRUBIN 0.8 10/09/2018 06:24 AM    ALBUMIN 3.9 10/09/2018 06:24 AM    TOTPROTEIN 6.2 10/09/2018 06:24 AM    GLOBULIN 2.3 10/09/2018 06:24 AM    AGRATIO 1.7 10/09/2018 06:24 AM   ]    No results found for: PROTHROMBTM, INR     Lab Results   Component Value Date/Time    WBC 3.7 (L) 10/09/2018 06:24 AM    WBC 4.7 01/31/2013 08:06 AM    RBC 5.23 10/09/2018 06:24 AM    RBC 5.14 01/31/2013 08:06 AM    HEMOGLOBIN 14.2 10/09/2018 06:24 AM    HEMATOCRIT 44.3 10/09/2018 06:24 AM    MCV 84.7 10/09/2018 06:24 AM    MCV 81 01/31/2013 08:06 AM    MCH 27.2 10/09/2018 06:24 AM    MCH 27.6 01/31/2013 08:06 AM    MCHC 32.1 (L) 10/09/2018 06:24 AM    MPV 10.7 10/09/2018 06:24 AM    NEUTSPOLYS 51.50 10/09/2018 06:24 AM    LYMPHOCYTES 32.40 10/09/2018 06:24 AM    MONOCYTES 9.70 10/09/2018 06:24 AM    EOSINOPHILS 4.30 10/09/2018 06:24 AM    BASOPHILS 2.10 (H) 10/09/2018 06:24 AM    HYPOCHROMIA 1+ 02/18/2014 06:04 AM        Lab Results   Component Value Date/Time    HBA1C 5.8 (H) 02/18/2014 06:04 AM        Imaging:   I personally reviewed following images, these are my reads  X-ray thoracic spine and ribs 10/13/2017  No acute fractures.    IMAGING  radiology reads. I reviewed the following radiology reads                                                                     Results for orders placed during the hospital encounter of 10/13/17   [id number] (WITH 1-VIEW CXR) RIGHT    Impression Negative right rib series.                                   Diagnosis   Visit Diagnoses     ICD-10-CM   1. Impaired fasting glucose R73.01   2. Type 2 diabetes mellitus without complication, unspecified whether long term insulin use (HCC) E11.9   3. Lumbar spondylosis M47.816   4. Chronic bilateral thoracic back pain M54.6    G89.29   5. Myalgia M79.10   6. Bilateral hip joint arthritis M16.0           ASSESSMENT:  Amanda Carr Haim 67 y.o. female      Amanda Carr was seen today for new patient.    Diagnoses and all orders for this visit:    Impaired fasting glucose  -     HEMOGLOBIN A1C; Future    Type 2 diabetes mellitus without complication, unspecified whether long term insulin use (HCC)  -     HEMOGLOBIN A1C; Future    Lumbar spondylosis    Chronic bilateral thoracic back pain  -     DX-THORACIC SPINE-2 VIEWS; Future    Myalgia    Bilateral hip joint arthritis  -     REFERRAL TO PHYSICIAL MEDICINE REHAB  -     DX-HIP-BILATERAL-WITH PELVIS-3/4 VIEWS; Future    Other orders  -     Cancel: HEMOGLOBIN A1C; Future      Chronic thoracic back pain with multiple trigger points and a fall which was after the imaging which was done approximately 1 year ago.  I placed a new referral for thoracic spine x-rays to evaluate for potential compression fracture.  Neurologically intact.    In regards to the patient's bilateral hips she has been receiving hip injections and is due for a repeat injection of intra-articular hip injections the bilateral hip secondary to hip arthritis.  I ordered an x-ray for further evaluation as well as referral for the patient to get these injections.  She is not interested in surgery at this time is been doing quite well with hip injections  and a home exercise program.    The risks benefits and alternatives to this procedure were discussed and the patient wishes to proceed with the procedure. Risks include but are not limited to damage to surrounding structures, infection, bleeding, worsening of pain which can be permanent, weakness which can be permanent. Benefits include pain relief, improved function. Alternatives includes not doing the procedure.           I also discussed the case with the patient's  who was at today's visit.    Outside records requested:  The patient signed outside records request form for her outside records including outside images.      Follow-up: 2 weeks after the above procedure      Please note that this dictation was created using voice recognition software. I have made every reasonable attempt to correct obvious errors but there may be errors of grammar and content that I may have overlooked prior to finalization of this note.      Donal Ball MD  Physical Medicine and Rehabilitation  Interventional Spine and Sports Physiatry  [facility]               Cricket Ibrahim M.D.

## 2018-12-03 NOTE — PATIENT INSTRUCTIONS
Your procedure will be at the [facility] special procedure suite.    [facility] [place] [address]       PRE-PROCEDURE INSTRUCTIONS  You may take your regular medications except:   · No Anti-inflammatories 5 days prior to your procedure. Anti-inflammatories include medicines such as  ibuprofen (Motrin, Advil), Excedrin, Naproxen (Aleve, Anaprox, Naprelan, Naprosyn), Celecoxib (Celebrex), Diclofenac (Voltaren-XR tab), and Meloxicam (Mobic).   · No Glucophage or Metformin 24 hours before your procedure. You may resume next day after your procedure.  · Call the physiatry office if you are taking or prescribed anti-biotics within five days of procedure.  · Please ask provider if you are taking any new diabetes medication.  · Pain medications will not be prescribed on the procedure day. Procedural pain medication may be used by your provider   · Call your doctor's office performing the procedure if you have a fever, chills, rash or new illness prior to your procedure    Anticoagulation/antiplatelet medications  No Blood thinning medications such as Coumadin or Plavix 5 days prior to procedure unless your doctor said to continue these medications. Call your doctor if a new medication is prescribed in this class.     Restrictions for eating before procedure:   · If you are getting procedural sedation, then do not eat to for 8 hours prior to procedure appointment time. Do not drink fluids for four hours prior to your procedure time.   · If you are not having procedural sedation, then Skip the meal prior to your procedure. If you have a morning procedure then skip breakfast. If you have an afternoon procedure then skip lunch.   · You may drink clear liquids up to 2 hours prior to your procedure  · You must have a  the day of procedure to accompany you home.      POST PROCEDURE INSTRUCTIONS   · No heavy lifting, strenuous bending or strenuous exercise for 3 days after your procedure.  · No hot tubs,  baths, swimming for 3 days after your procedure  · You can remove the bandage the day after the procedure.  · If you received a steroid injection  · please note that there may be a delay for the injection to start working, the delay may be up to two weeks.   · If you experience severe pain or prolonged weakness longer than one day, bowel or bladder incontinence then please call the physiatry office.   · If you have diabetes, please note that your sugar levels may be elevated for 1-2 days after a steroid injection.   · If you received a diagnostic procedure (such as a medial branch block), please note that we do expect this injection to wear off. It is important to complete the pain diary and list the pain score only for the region where the procedure was and bring this to your follow up visit.   · If you received a radiofrequency procedure, there may be some soreness after the procedure. This is normal.   · Your leg may feel heavy, weak and numb for up to 1-2 days. Be very careful walking.   ·  You may resume normal activities 3 days after procedure.

## 2018-12-04 ENCOUNTER — HOSPITAL ENCOUNTER (OUTPATIENT)
Dept: RADIATION ONCOLOGY | Facility: MEDICAL CENTER | Age: 68
End: 2018-12-31
Attending: RADIOLOGY
Payer: MEDICARE

## 2018-12-04 VITALS
DIASTOLIC BLOOD PRESSURE: 65 MMHG | TEMPERATURE: 98.6 F | SYSTOLIC BLOOD PRESSURE: 130 MMHG | HEART RATE: 79 BPM | OXYGEN SATURATION: 95 %

## 2018-12-04 PROCEDURE — 99212 OFFICE O/P EST SF 10 MIN: CPT | Performed by: RADIOLOGY

## 2018-12-04 PROCEDURE — 99214 OFFICE O/P EST MOD 30 MIN: CPT | Performed by: RADIOLOGY

## 2018-12-04 ASSESSMENT — PAIN SCALES - GENERAL: PAINLEVEL: 8=MODERATE-SEVERE PAIN

## 2018-12-04 NOTE — NON-PROVIDER
Patient was seen today in clinic with Dr. Olguin for Follow up.  Vitals signs and weight were obtained and pain assessment was completed.  Allergies and medications were reviewed with the patient.  Toxicities of treatment assessed.     Vitals/Pain:  Vitals:    12/04/18 0754   BP: 130/65   Pulse: 79   Temp: 37 °C (98.6 °F)   SpO2: 95%   Pain Score: 8=Moderate-Severe Pain (Chronic pain )        Allergies:   Alcohol; Asa [aspirin]; Bee; Iodine; Shellfish allergy; Sulfa drugs; Tetanus antitoxin; Tape; Influenza vaccines; Other environmental; and Synthroid [levothroid]    Current Medications:  Current Outpatient Prescriptions   Medication Sig Dispense Refill   • acetaminophen (TYLENOL) 500 MG Tab Take 500-1,000 mg by mouth every 6 hours as needed.     • atenolol (TENORMIN) 50 MG Tab Take 1 Tab by mouth every day. 90 Tab 4   • furosemide (LASIX) 20 MG Tab Take 1 Tab by mouth every day. 90 Tab 4   • thyroid (ARMOUR THYROID) 120 MG Tab TAKE 1/2 TABLET ORALLY TWICE A DAY 90 Tab 4   • thyroid (ARMOUR THYROID) 90 MG Tab TAKE 1 TABLET BY MOUTH ONCE DAILY 90 Tab 4   • traZODone (DESYREL) 150 MG Tab Take 1 Tab by mouth every bedtime. 90 Tab 4   • liothyronine (CYTOMEL) 25 MCG Tab Take 1 Tab by mouth every day. 90 Tab 4   • nitrofurantoin (MACRODANTIN) 50 MG Cap Take 1 Cap by mouth 4 times a day. (Patient not taking: Reported on 12/3/2018) 360 Cap 4   • albuterol 108 (90 Base) MCG/ACT Aero Soln inhalation aerosol Inhale 2 Puffs by mouth every 6 hours as needed. 8.5 g 3   • Glucos-Chond-Hyal Ac-Ca Fructo (MOVE FREE JOINT HEALTH ADVANCE) Tab Take  by mouth every day.       No current facility-administered medications for this encounter.          PCP:  Noemy Paul, Med Ass't

## 2018-12-04 NOTE — PROGRESS NOTES
RADIATION ONCOLOGY FOLLOW UP    DATE OF SERVICE: 12/4/2018    IDENTIFICATION:   Right-sided cavernous sinus meningioma, treated with stereotactic   radiosurgery to 2100 cGy in 3 fractions completing in March 2015.     INTERVAL HISTORY: I had the pleasure of seeing Ms. Whitmore today in follow up for her meningioma.  From a symptom standpoint patient has done remarkably well.  He does not have any neurologic symptoms she would attribute to her meningioma or treatment.  Her most recent MRI looks stable from previous years exam and slightly regressed from her initial presentation.    PHYSICAL EXAM:    Vitals:    12/04/18 0754   BP: 130/65   Pulse: 79   Temp: 37 °C (98.6 °F)   SpO2: 95%   Pain Score: 8=Moderate-Severe Pain (Chronic pain )      1= Restricted in physically strenuous activity, but ambulatory and able to carry out work of a light sedentary nature, e.g., light housework, office work.    GENERAL: No apparent distress.  HEENT:  Pupils are equal, round, and reactive to light.  Extraocular muscles   are intact. Sclerae nonicteric.  Conjunctivae pink.  Oral cavity, tongue   protrudes midline.   NECK:  Supple without evidence of thyromegaly.  NODES:  No peripheral adenopathy of the neck, supraclavicular fossa or axillae   bilaterally.  LUNGS:  Clear to ascultation bilaterally   HEART:  Regular rate and rhythm.  No murmur appreciated  ABDOMEN:  Soft. No evidence of hepatosplenomegaly.  Positive bowel sounds.  EXTREMITIES:  Without Edema.  NEUROLOGIC:  Cranial nerves II through XII were intact. Normal stance and gait motor and sensory grossly within normal limits      IMPRESSION:   Right-sided cavernous sinus meningioma, treated with stereotactic   radiosurgery to 2100 cGy in 3 fractions completing in March 2015.    RECOMMENDATIONS:   I discussed the patient her findings over a 30 minute time period, 95% of that time dedicated to an ongoing survivorship plan.  I would like to continue her surveillance with MRI of the  brain.  However with her current findings I would feel comfortable now switching this to every 2 years.  Patient does have some stress around the MRIs requiring premedication.  She understands with switching to a 2-year interval we can not yet placed the MRI order.  This will need to be placed in the year of her evaluation.  She plans on contact me before her MRI for refill of her medication.    If patient has any questions or concerns, she should feel free to contact me.

## 2018-12-05 ENCOUNTER — TELEPHONE (OUTPATIENT)
Dept: MEDICAL GROUP | Age: 68
End: 2018-12-05

## 2018-12-06 NOTE — TELEPHONE ENCOUNTER
1. Caller Name: Patient                                        Call Back Number: [phone] (home)       Patient approves a detailed voicemail message: yes    Patient called to inform us that her Amour thyroid was denied, she got this notification by phone. I attempted to call the correct person at [facility] and left a message but did not receive a call back.    There is a letter in this patients chart that  wrote a letter that states the medical necessity. We should make sure this is being included in her appeal.     Please FV and thank you!!

## 2019-01-03 ENCOUNTER — TELEPHONE (OUTPATIENT)
Dept: PHYSICAL MEDICINE AND REHAB | Facility: MEDICAL CENTER | Age: 69
End: 2019-01-03

## 2019-01-03 NOTE — TELEPHONE ENCOUNTER
Patient called and stated she wanted to know the results of her recent x-rays. She stated that she is fine discussing them on 1/7/19 at her special procedure as well.

## 2019-01-07 ENCOUNTER — HOSPITAL ENCOUNTER (OUTPATIENT)
Dept: PAIN MANAGEMENT | Facility: REHABILITATION | Age: 69
End: 2019-01-07
Attending: PHYSICAL MEDICINE & REHABILITATION
Payer: MEDICARE

## 2019-01-07 ENCOUNTER — HOSPITAL ENCOUNTER (OUTPATIENT)
Dept: RADIOLOGY | Facility: REHABILITATION | Age: 69
End: 2019-01-07
Attending: PHYSICAL MEDICINE & REHABILITATION

## 2019-01-07 VITALS
BODY MASS INDEX: 29.23 KG/M2 | HEIGHT: 63 IN | TEMPERATURE: 98 F | DIASTOLIC BLOOD PRESSURE: 75 MMHG | HEART RATE: 68 BPM | RESPIRATION RATE: 16 BRPM | SYSTOLIC BLOOD PRESSURE: 179 MMHG | OXYGEN SATURATION: 96 % | WEIGHT: 165 LBS

## 2019-01-07 PROCEDURE — 20610 DRAIN/INJ JOINT/BURSA W/O US: CPT

## 2019-01-07 PROCEDURE — 700111 HCHG RX REV CODE 636 W/ 250 OVERRIDE (IP)

## 2019-01-07 PROCEDURE — A9585 GADOBUTROL INJECTION: HCPCS

## 2019-01-07 PROCEDURE — 700117 HCHG RX CONTRAST REV CODE 255

## 2019-01-07 RX ORDER — DEXAMETHASONE SODIUM PHOSPHATE 10 MG/ML
INJECTION, SOLUTION INTRAMUSCULAR; INTRAVENOUS
Status: COMPLETED
Start: 2019-01-07 | End: 2019-01-07

## 2019-01-07 RX ORDER — LIDOCAINE HYDROCHLORIDE 10 MG/ML
INJECTION, SOLUTION EPIDURAL; INFILTRATION; INTRACAUDAL; PERINEURAL
Status: COMPLETED
Start: 2019-01-07 | End: 2019-01-07

## 2019-01-07 RX ORDER — GADOBUTROL 604.72 MG/ML
INJECTION INTRAVENOUS
Status: COMPLETED
Start: 2019-01-07 | End: 2019-01-07

## 2019-01-07 RX ADMIN — GADOBUTROL 9 ML: 604.72 INJECTION INTRAVENOUS at 14:48

## 2019-01-07 RX ADMIN — LIDOCAINE HYDROCHLORIDE 10 ML: 10 INJECTION, SOLUTION EPIDURAL; INFILTRATION; INTRACAUDAL; PERINEURAL at 14:49

## 2019-01-07 RX ADMIN — DEXAMETHASONE SODIUM PHOSPHATE 20 MG: 10 INJECTION, SOLUTION INTRAMUSCULAR; INTRAVENOUS at 14:50

## 2019-01-07 ASSESSMENT — PAIN SCALES - GENERAL
PAINLEVEL_OUTOF10: 0
PAINLEVEL_OUTOF10: 0
PAINLEVEL_OUTOF10: 8

## 2019-01-07 NOTE — NON-PROVIDER
Current meds. See medication reconciliation form. Reviewed with pt. Pt denies taking ASA,other blood thinners or anti-inflammatories. Pt has a ride post-procedure (, [place] is ). Printed and verbal discharge instructions given to pt who verbalized understanding.

## 2019-01-07 NOTE — PROCEDURES
Date of Service: 1/7/2019     Patient: Amanda Whitmore 68 y.o. female     MRN: [id number]     Physician/s: Donal Ball MD    Pre-operative Diagnosis: bilateral   hip osteoarthritis    Post-operative Diagnosis: bilateral hip osteoarthritis    Procedure: bilateral  intra-articular Hip injection with fluoroscopic guidance    Description of procedure:    The risks, benefits, and alternatives of the procedure were reviewed and discussed with the patient.  Written informed consent was freely obtained. A pre-procedural time-out was conducted by the physician verifying patient’s identity, procedure to be performed, procedure site and side, and allergy verification. Appropriate equipment was determined to be in place for the procedure.     The femoral artery nerve and vein as well as the inguinal ligament were identified with ultrasound and marked with a marking pen on both hip joints.  The entire procedure took place lateral to the femoral vessels and nerve and inferior to the inguinal ligament.    In the procedure suite the patient was placed in a supine position with and the skin was prepped and draped in the usual sterile fashion. A 27-gauge 1.5 inch needle was used with approximately 2 mL of 1% lidocaine for local anesthesia at the junction of the femoral head and neck.}  A 25g 3.5 inch needle was placed into skin and advanced under fluoroscopic guidance into the right hip joint space at the head neck junction with an anterior approach. 4 mL of contrast was used to clearly demonstrate the outlining of the joint capsule. Following negative aspiration, approx 5cc of 1% lidocaine with 4mg/mL dexamethasone was then injected into the joint, and the needle was subsequently removed intact after restyleted.     A 27-gauge 1.5 inch needle was used with approximately 2 mL of 1% lidocaine for local anesthesia at the junction of the femoral head and neck.}  A 25g 3.5 inch needle was placed into skin and advanced under  fluoroscopic guidance into the left hip joint space at the head neck junction with an anterior approach. 4 mL of contrast was used to clearly demonstrate the outlining of the joint capsule. Following negative aspiration, approx 5cc of 1% lidocaine with 4mg/mL dexamethasone was then injected into the joint, and the needle was subsequently removed intact after restyleted. The patient's hip was wiped with a 4x4 gauze, the area was cleansed with alcohol prep, and a bandaid was applied. There were no complications noted.           Donal Ball MD  Physical Medicine and Rehabilitation  Interventional Spine and Sports Physiatry  [facility]          CPT  Major joint/bursa:  37878-05  Fluoroscopic  needle guidance [id number]

## 2019-01-07 NOTE — H&P
Physiatry (physical medicine and  Rehabilitation), interventional spine and sports medicine     Date of Service: 12/3/2018     Chief complaint:        Chief Complaint   Patient presents with   • New Patient       back pain         HISTORY     HPI: Amanda Whitmore 67 y.o. female who presents today with Diagnoses of Impaired fasting glucose, Type 2 diabetes mellitus without complication, unspecified whether long term insulin use (HCC), Lumbar spondylosis, Chronic bilateral thoracic back pain, Myalgia, and Bilateral hip joint arthritis were pertinent to this visit.    The patient is having chronic midthoracic back pain for the past year.  She does note a fall in October 2017, x-rays were normal at that time.  The patient states she had another fall in approximately June 2018 while she stepped into a slippery bathtub trying to close a window.  No falls at ground level.  At that time the thoracic back pain had worsened however she has not had an evaluation for this.  Pain is nonradiating, moderate to severe in intensity, aching, chronic, constant, worse with palpation, worse with movement.     Patient also has chronic bilateral hip arthritis and was receiving a intra-articular hip injection with significant pain relief for approximately 6 months however the last injection was January 2018.  Aching pain in the bilateral hips, worse with walking, right worse than left, groin pain, nonradiating, aching.  Pain is sharp at times.     Opioid Risk Score: 0     Interpretation of Opioid Risk Score   Score 0-3 = Low risk of abuse. Do UDS at least once per year.  Score 4-7 = Moderate risk of abuse. Do UDS 1-4 times per year.  Score 8+ = High risk of abuse. Refer to specialist.     PHQ  Depression Screen (PHQ-2/PHQ-9) 10/30/2017 10/30/2018 12/3/2018   PHQ-2 Total Score - - -   PHQ-2 Total Score 0 0 0         Interpretation of PHQ-9 Total Score   Score Severity   1-4 No Depression   5-9 Mild Depression   10-14 Moderate  Depression   15-19 Moderately Severe Depression   20-27 Severe Depression      Medical records review:  I reviewed the procedure notes from Dr. Robertson From 4/16/2015, 8/28/2014, 4/17/2014.  Intra-articular hip injections done with an 18-gauge 3-1/2 inch spinal needle with Depo-Medrol.     ROS:   History of chronic ringing in the years, nothing acute.  History of gastric ulcers but no acute pain.  Red Flags ROS:   Fever, Chills, Sweats: Denies  Involuntary Weight Loss: Denies  Bladder Incontinence: Denies  Bowel Incontinence: denies  Saddle Anesthesia: Denies     All other systems reviewed and negative.        PMHx:        Past Medical History:   Diagnosis Date   • Anesthesia       vertigo, PONV has trouble clearing anesthesia secondary to decreased kidney function   • Arthritis       hips, hands, knees   • Bronchitis 6/2014   • Cancer (HCC) 2015     brain, radiation March 2015   • Coughing blood     • GERD (gastroesophageal reflux disease)       on med   • Heart burn     • Hiatus hernia syndrome     • Hypertension     • Indigestion     • Pain 04/16/14     bilateral hips 4/10   • Pneumonia 12/2013   • Renal disorder       chronic insufficiency 40-80% fx (per pt; most recent BUN/Creat were WNL); gets kidney infections easily   • Sleep apnea       uses breath right strips   • Snoring     • Thyroid disease     • Type II or unspecified type diabetes mellitus without mention of complication, not stated as uncontrolled       Diet controlled         PSHx:         Past Surgical History:   Procedure Laterality Date   • BUNIONECTOMY DIXIE Right 12/15/2016     Procedure: BUNIONECTOMY DIXIE - BRIAN;  Surgeon: DAVIDA DeutschPALMAZ;  Location: SURGERY [facility];  Service:    • JOINT INJECTION DIAGNOSTIC   4/16/2015     Performed by Frank Robertson M.D. at SURGERY [facility]   • JOINT INJECTION DIAGNOSTIC   8/28/2014     Performed by Frank Robertson M.D. at [facility]   • JOINT INJECTION DIAGNOSTIC  "  4/17/2014     Performed by Frank Robertson M.D. at SURGERY [facility] ORS   • CHOLECYSTECTOMY   1998   • ABDOMINAL HYSTERECTOMY TOTAL   1980's   • TONSILLECTOMY         as a child         Family history         Family History   Problem Relation Age of Onset   • Heart Attack Mother     • Cancer Father     • Hypertension Father     • Stroke Father              Medications: reviewed on epic.          Outpatient Prescriptions Marked as Taking for the 12/3/18 encounter (Office Visit) with Donal Ball M.D.   Medication Sig Dispense Refill   • atenolol (TENORMIN) 50 MG Tab Take 1 Tab by mouth every day. 90 Tab 4   • furosemide (LASIX) 20 MG Tab Take 1 Tab by mouth every day. 90 Tab 4   • thyroid (ARMOUR THYROID) 120 MG Tab TAKE 1/2 TABLET ORALLY TWICE A DAY 90 Tab 4   • thyroid (ARMOUR THYROID) 90 MG Tab TAKE 1 TABLET BY MOUTH ONCE DAILY 90 Tab 4   • traZODone (DESYREL) 150 MG Tab Take 1 Tab by mouth every bedtime. 90 Tab 4   • liothyronine (CYTOMEL) 25 MCG Tab Take 1 Tab by mouth every day. 90 Tab 4         Allergies:         Allergies   Allergen Reactions   • Alcohol Anaphylaxis       ETOH   • Asa [Aspirin] Anaphylaxis   • Bee Anaphylaxis   • Iodine Hives and Shortness of Breath       Contrast; \"will black out\"   • Shellfish Allergy Anaphylaxis   • Sulfa Drugs Anaphylaxis       \"will kill me\"   • Tetanus Antitoxin Nausea and Swelling       \"Swelling and violently ill\"; increased temp!!!!!!   • Tape Contact Dermatitis       Paper tape ok; will get welts and peeling skin with regular tape   • Influenza Vaccines Unspecified       Local reaction with deltoid pain swelling and swelling   • Other Environmental         Pt states that she is allergic \"to the sun\"   • Synthroid [Levothroid]         Nausea fatigue malaise         Social Hx:   Social History            Social History   • Marital status:        Spouse name: N/A   • Number of children: N/A   • Years of education: N/A          Occupational History   • " "Not on file.             Social History Main Topics   • Smoking status: Former Smoker       Packs/day: 1.00       Years: 4.00       Types: Cigarettes       Quit date: 1/1/1965   • Smokeless tobacco: Never Used   • Alcohol use No         Comment: ALLERGIC TO IT   • Drug use: No   • Sexual activity: Not Currently           Other Topics Concern   •  Service No   • Blood Transfusions No   • Caffeine Concern No   • Occupational Exposure No   • Hobby Hazards No   • Sleep Concern Yes   • Stress Concern No   • Weight Concern No   • Special Diet No   • Back Care No   • Exercise No   • Bike Helmet No   • Seat Belt Yes   • Self-Exams Yes          Social History Narrative   • No narrative on file            EXAMINATION      Physical Exam:   Vitals: Blood pressure 116/82, pulse 78, temperature 36.4 °C (97.6 °F), temperature source Temporal, height 1.6 m (5' 3\"), weight 74.4 kg (164 lb), SpO2 95 %, not currently breastfeeding.     Constitutional:   Body Habitus: Body mass index is 29.05 kg/m².  Cooperation: Fully cooperates with exam  Appearance: Well-groomed, well-nourished, not disheveled      Eyes: No scleral icterus to suggest severe liver disease, no proptosis to suggest severe hyperthyroid     ENT -no obvious auditory deficits, no obvious tongue lesions, tongue midline, no facial droop      Skin -no rashes or lesions noted      Respiratory-  breathing comfortable on room air, no audible wheezing     Cardiovascular- capillary refills less than 2 seconds. No lower extremity edema is noted.      Gastrointestinal - no obvious abdominal masses, No tenderness to palpation in the abdomen     Psychiatric- alert and oriented ×3. Normal affect.      Gait -short strides, appears to have pain in the bilateral hips right worse than left.  Balance is appropriate.     Musculoskeletal -   Thoracic/Lumbar Spine/Sacral Spine/Hips   Inspection: No evidence of atrophy in bilateral lower extremities throughout      ROM: full  AROM with " flexion, extension, lateral flexion, and rotation bilaterally, without pain      Palpation:   Tenderness palpation mid thoracic spinous processes and thoracic paraspinous muscles.  No tenderness percussion.  No tenderness palpation of the lumbar spine or lumbar paraspinous muscles.  palpation over SI joint: negative bilaterally    palpation over buttock: negative bilaterally    palpation in hip or over the greater trochanters: negative bilaterally       Lumbar spine Special tests  Neuro tension  Straight leg test negative bilaterally    Slump test negative bilaterally       HIP  FAIR test positive bilaterally    Range of motion in the hips is within limited in flexion, extension, abduction, internal rotation, external rotation.     SI joint tests  Observation patient sits on one buttocks: Negative  SI joint compression negative bilaterally    SI joint distraction negative bilaterally    Thigh thrust test negative bilaterally    ANDERS test negative bilaterally         Neuro         Key points for the international standards for neurological classification of spinal cord injury (ISNCSCI) to light touch.      Dermatome R L   C4 2 2   C5 2 2   C6 2 2   C7 2 2   C8 2 2   T1 2 2   T2 2 2   L2 2 2   L3 2 2   L4 2 2   L5 2 2   S1 2 2   S2 2 2               Motor Exam Upper Extremities   ? Myotome R L   Shoulder flexion C5 5 5   Elbow flexion C5 5 5   Wrist extension C6 5 5   Elbow extension C7 5 5   Finger flexion C8 5 5   Finger abduction T1 5 5            Motor Exam Lower Extremities     ? Myotome R L   Hip flexion L2 5 5   Knee extension L3 5 5   Ankle dorsiflexion L4 5 5   Toe extension L5 5 5   Ankle plantarflexion S1 5 5                       Tone on Modified Bashir Scale     R L   R L   Shoulder Adduction Negative  Negative  Hip Flexion Negative  Negative    Elbow Flexion Negative  Negative  Hip Extension Negative  Negative    Elbow Extension Negative  Negative  Hip Adduction Negative  Negative    Wrist Flexion  Negative  Negative  Knee Extension Negative  Negative    Finger Flexion Negative  Negative  Knee Flexion Negative  Negative          Dorsiflexion Negative  Negative          Plantar Flexion Negative  Negative      Jackson’s sign negative bilaterally   Babinski sign negative bilaterally   Clonus of the ankle negative bilaterally      Reflexes  ?   R L   Biceps   2+ 2+   Brachioradialis   2+ 2+   Patella   2+ 2+   Achilles    2+ 2+               MEDICAL DECISION MAKING     Medical records review: see under HPI section.      DATA     Labs:         Lab Results   Component Value Date/Time     SODIUM 142 10/09/2018 06:24 AM     POTASSIUM 3.7 10/09/2018 06:24 AM     CHLORIDE 108 10/09/2018 06:24 AM     CO2 27 10/09/2018 06:24 AM     ANION 7.0 10/09/2018 06:24 AM     GLUCOSE 112 (H) 10/09/2018 06:24 AM     BUN 9 10/09/2018 06:24 AM     CREATININE 0.54 10/09/2018 06:24 AM     CREATININE 0.63 01/31/2013 08:06 AM     CALCIUM 9.7 10/09/2018 06:24 AM     ASTSGOT 17 10/09/2018 06:24 AM     ALTSGPT 14 10/09/2018 06:24 AM     TBILIRUBIN 0.8 10/09/2018 06:24 AM     ALBUMIN 3.9 10/09/2018 06:24 AM     TOTPROTEIN 6.2 10/09/2018 06:24 AM     GLOBULIN 2.3 10/09/2018 06:24 AM     AGRATIO 1.7 10/09/2018 06:24 AM   ]     No results found for: PROTHROMBTM, INR            Lab Results   Component Value Date/Time     WBC 3.7 (L) 10/09/2018 06:24 AM     WBC 4.7 01/31/2013 08:06 AM     RBC 5.23 10/09/2018 06:24 AM     RBC 5.14 01/31/2013 08:06 AM     HEMOGLOBIN 14.2 10/09/2018 06:24 AM     HEMATOCRIT 44.3 10/09/2018 06:24 AM     MCV 84.7 10/09/2018 06:24 AM     MCV 81 01/31/2013 08:06 AM     MCH 27.2 10/09/2018 06:24 AM     MCH 27.6 01/31/2013 08:06 AM     MCHC 32.1 (L) 10/09/2018 06:24 AM     MPV 10.7 10/09/2018 06:24 AM     NEUTSPOLYS 51.50 10/09/2018 06:24 AM     LYMPHOCYTES 32.40 10/09/2018 06:24 AM     MONOCYTES 9.70 10/09/2018 06:24 AM     EOSINOPHILS 4.30 10/09/2018 06:24 AM     BASOPHILS 2.10 (H) 10/09/2018 06:24 AM     HYPOCHROMIA 1+  02/18/2014 06:04 AM               Lab Results   Component Value Date/Time     HBA1C 5.8 (H) 02/18/2014 06:04 AM         Imaging:   I personally reviewed following images, these are my reads  X-ray thoracic spine and ribs 10/13/2017  No acute fractures.     IMAGING radiology reads. I reviewed the following radiology reads                                                                           Results for orders placed during the hospital encounter of 10/13/17   [id number] (WITH 1-VIEW CXR) RIGHT     Impression Negative right rib series.                                    Diagnosis        Visit Diagnoses       ICD-10-CM   1. Impaired fasting glucose R73.01   2. Type 2 diabetes mellitus without complication, unspecified whether long term insulin use (HCC) E11.9   3. Lumbar spondylosis M47.816   4. Chronic bilateral thoracic back pain M54.6     G89.29   5. Myalgia M79.10   6. Bilateral hip joint arthritis M16.0               ASSESSMENT:  Amanda Carr Haim 67 y.o. female      Amanda Carr was seen today for new patient.     Diagnoses and all orders for this visit:     Impaired fasting glucose  -     HEMOGLOBIN A1C; Future     Type 2 diabetes mellitus without complication, unspecified whether long term insulin use (HCC)  -     HEMOGLOBIN A1C; Future     Lumbar spondylosis     Chronic bilateral thoracic back pain  -     DX-THORACIC SPINE-2 VIEWS; Future     Myalgia     Bilateral hip joint arthritis  -     REFERRAL TO PHYSICIAL MEDICINE REHAB  -     DX-HIP-BILATERAL-WITH PELVIS-3/4 VIEWS; Future     Other orders  -     Cancel: HEMOGLOBIN A1C; Future        Chronic thoracic back pain with multiple trigger points and a fall which was after the imaging which was done approximately 1 year ago.  I placed a new referral for thoracic spine x-rays to evaluate for potential compression fracture.  Neurologically intact.     In regards to the patient's bilateral hips she has been receiving hip injections and is due for  "a repeat injection of intra-articular hip injections the bilateral hip secondary to hip arthritis.  I ordered an x-ray for further evaluation as well as referral for the patient to get these injections.  She is not interested in surgery at this time is been doing quite well with hip injections and a home exercise program.     The risks benefits and alternatives to this procedure were discussed and the patient wishes to proceed with the procedure. Risks include but are not limited to damage to surrounding structures, infection, bleeding, worsening of pain which can be permanent, weakness which can be permanent. Benefits include pain relief, improved function. Alternatives includes not doing the procedure.             I also discussed the case with the patient's  who was at today's visit.     Outside records requested:  The patient signed outside records request form for her outside records including outside images.        Follow-up: 2 weeks after the above procedure        Please note that this dictation was created using voice recognition software. I have made every reasonable attempt to correct obvious errors but there may be errors of grammar and content that I may have overlooked prior to finalization of this note.        Donal Ball MD  Physical Medicine and Rehabilitation  Interventional Spine and Sports Physiatry  [facility]         ADDENDUM     No significant changes. Patient is not on anticoagulation, antibiotics, antiplatelet drugs. Still with bilateral hip pain        Vitals:    01/07/19 1325 01/07/19 1440 01/07/19 1450 01/07/19 1455   BP: 156/81 147/66 157/70 (!) 179/75   Pulse: 73 70 72 68   Resp: 16 16 16 16   Temp: 36.7 °C (98 °F)      SpO2: 95% 97% 95% 96%   Weight: 74.8 kg (165 lb)      Height: 1.6 m (5' 3\")         Gen: NAD  Resp: Clear to auscultation bilaterally, no wheezing. Breathing comfortable on room air.  Cardiovascular: Regular rate and rhythm, no murmurs rubs or " artemio.       Okay to proceed with bilateral hip injection     Donal Ball MD  Physical Medicine and Rehabilitation  Interventional Spine and Sports Physiatry  [facility]  1/7/2019  3:21 PM

## 2019-01-09 ENCOUNTER — TELEPHONE (OUTPATIENT)
Dept: PHYSICAL MEDICINE AND REHAB | Facility: MEDICAL CENTER | Age: 69
End: 2019-01-09

## 2019-01-09 NOTE — TELEPHONE ENCOUNTER
Spoke to Pt in regards to her Special procedure that was done 1/7/19 w/ Dr. Ball and she mentioned that she is doing fine.    Thank you  Dee Dee

## 2019-01-23 ENCOUNTER — TELEPHONE (OUTPATIENT)
Dept: PHYSICAL MEDICINE AND REHAB | Facility: MEDICAL CENTER | Age: 69
End: 2019-01-23

## 2019-01-23 ENCOUNTER — OFFICE VISIT (OUTPATIENT)
Dept: PHYSICAL MEDICINE AND REHAB | Facility: MEDICAL CENTER | Age: 69
End: 2019-01-23
Payer: MEDICARE

## 2019-01-23 VITALS
TEMPERATURE: 97 F | HEIGHT: 63 IN | BODY MASS INDEX: 29.59 KG/M2 | DIASTOLIC BLOOD PRESSURE: 62 MMHG | HEART RATE: 82 BPM | SYSTOLIC BLOOD PRESSURE: 118 MMHG | OXYGEN SATURATION: 93 % | WEIGHT: 167 LBS

## 2019-01-23 DIAGNOSIS — E11.9 TYPE 2 DIABETES MELLITUS WITHOUT COMPLICATION, UNSPECIFIED WHETHER LONG TERM INSULIN USE (HCC): ICD-10-CM

## 2019-01-23 DIAGNOSIS — M70.61 GREATER TROCHANTERIC BURSITIS OF BOTH HIPS: ICD-10-CM

## 2019-01-23 DIAGNOSIS — M70.62 GREATER TROCHANTERIC BURSITIS OF BOTH HIPS: ICD-10-CM

## 2019-01-23 DIAGNOSIS — M54.6 CHRONIC BILATERAL THORACIC BACK PAIN: ICD-10-CM

## 2019-01-23 DIAGNOSIS — M16.0 BILATERAL HIP JOINT ARTHRITIS: ICD-10-CM

## 2019-01-23 DIAGNOSIS — M47.816 LUMBAR SPONDYLOSIS: ICD-10-CM

## 2019-01-23 DIAGNOSIS — R20.0 NUMBNESS OF RIGHT FOOT: ICD-10-CM

## 2019-01-23 DIAGNOSIS — G89.29 CHRONIC BILATERAL THORACIC BACK PAIN: ICD-10-CM

## 2019-01-23 PROCEDURE — 99214 OFFICE O/P EST MOD 30 MIN: CPT | Performed by: PHYSICAL MEDICINE & REHABILITATION

## 2019-01-23 ASSESSMENT — PAIN SCALES - GENERAL: PAINLEVEL: 2=MINIMAL-SLIGHT

## 2019-01-23 ASSESSMENT — PATIENT HEALTH QUESTIONNAIRE - PHQ9: CLINICAL INTERPRETATION OF PHQ2 SCORE: 0

## 2019-01-23 NOTE — Clinical Note
Dear Cricket Salguero M.D. ,   Thank you for the referral of Amanda Whitmore. The patient had near complete resolution of the bilateral hip pain following Fluoroscopically guided intra-articular hip injections.  I will continue to follow-up with the patient.  Please see my note for more details    Should you have any questions or concerns please do not hesitate to contact me.  Donal Ball M.D.

## 2019-01-23 NOTE — PROGRESS NOTES
Follow up patient note  Interventional spine and sports physiatry, Physical medicine rehabilitation      Chief complaint:   Chief Complaint   Patient presents with   • Follow-Up     hip pain          HISTORY    Please see new patient note dated  by Dr Ball,  for more details.     HPI  Patient identification: Amanda Whitmore 68 y.o. female  With Diagnoses of Type 2 diabetes mellitus without complication, unspecified whether long term insulin use (HCC), Lumbar spondylosis, Chronic bilateral thoracic back pain, Bilateral hip joint arthritis, Greater trochanteric bursitis of both hips, and Numbness of right foot were pertinent to this visit.           Type 2 diabetes mellitus without complication, unspecified whether long term insulin use (HCC)  Comments:  Patient reports controlled with diet and exercise    Lumbar spondylosis  Comments:  Stable, back pain also improved after hip injection    Chronic bilateral thoracic back pain    Bilateral hip joint arthritis  Comments:  Significantly improved in terms of pain and function.  Patient is 90% improvement.  Patient does get mild intermittent pain in the bilateral hips however this is significantly improved.  Aching quality.  Groin pain.  Worse with exercise.    Greater trochanteric bursitis of both hips  Comments:  Difficult for the patient to sleep on her side however this is not interrupting her sleep at this time.  No functional deficit secondary to lateral hip pain.    Numbness of right foot  Comments:  Possibly secondary to L5 radiculopathy.  Mild.  Not bothering the patient much at this time.  Monitor.  Not affecting function at this time.  No complaints of weakness, bowel or bladder dysfunction.       ROS Red Flags :   -  Fever, Chills, Sweats: Denies  Involuntary Weight Loss: Denies  Bowel/Bladder Incontinence: Denies  Saddle Anesthesia: Denies        PMHx:   Past Medical History:   Diagnosis Date   • Anesthesia     vertigo, PONV has trouble clearing  anesthesia secondary to decreased kidney function   • Arthritis     hips, hands, knees   • Bronchitis 6/2014   • Cancer (HCC) 2015    brain, radiation March 2015   • Coughing blood    • GERD (gastroesophageal reflux disease)     on med   • Heart burn    • Hiatus hernia syndrome    • Hypertension    • Indigestion    • Pain 04/16/14    bilateral hips 4/10   • Pneumonia 12/2013   • Renal disorder     chronic insufficiency 40-80% fx (per pt; most recent BUN/Creat were WNL); gets kidney infections easily   • Sleep apnea     uses breath right strips   • Snoring    • Thyroid disease    • Type II or unspecified type diabetes mellitus without mention of complication, not stated as uncontrolled     Diet controlled       PSHx:   Past Surgical History:   Procedure Laterality Date   • BUNIONECTOMY DIXIE Right 12/15/2016    Procedure: BUNIONECTOMY DIXIE TORRES;  Surgeon: Shankar Armas D.P.M.;  Location: SURGERY [facility];  Service:    • JOINT INJECTION DIAGNOSTIC  4/16/2015    Performed by Frank Robertson M.D. at SURGERY [facility]   • JOINT INJECTION DIAGNOSTIC  8/28/2014    Performed by Frank Robertson M.D. at [facility]   • JOINT INJECTION DIAGNOSTIC  4/17/2014    Performed by Frank Robertson M.D. at SURGERY [facility]   • CHOLECYSTECTOMY  1998   • ABDOMINAL HYSTERECTOMY TOTAL  1980's   • TONSILLECTOMY      as a child       Family history   Family History   Problem Relation Age of Onset   • Heart Attack Mother    • Cancer Father    • Hypertension Father    • Stroke Father          Medications:   Outpatient Prescriptions Marked as Taking for the 1/23/19 encounter (Office Visit) with Donal Ball M.D.   Medication Sig Dispense Refill   • acetaminophen (TYLENOL) 500 MG Tab Take 500-1,000 mg by mouth every 6 hours as needed.     • atenolol (TENORMIN) 50 MG Tab Take 1 Tab by mouth every day. 90 Tab 4   • furosemide (LASIX) 20 MG Tab Take 1 Tab by mouth every day. 90 Tab 4   • thyroid  "(ARMOUR THYROID) 120 MG Tab TAKE 1/2 TABLET ORALLY TWICE A DAY 90 Tab 4   • thyroid (ARMOUR THYROID) 90 MG Tab TAKE 1 TABLET BY MOUTH ONCE DAILY 90 Tab 4   • traZODone (DESYREL) 150 MG Tab Take 1 Tab by mouth every bedtime. 90 Tab 4   • liothyronine (CYTOMEL) 25 MCG Tab Take 1 Tab by mouth every day. 90 Tab 4   • nitrofurantoin (MACRODANTIN) 50 MG Cap Take 1 Cap by mouth 4 times a day. 360 Cap 4   • albuterol 108 (90 Base) MCG/ACT Aero Soln inhalation aerosol Inhale 2 Puffs by mouth every 6 hours as needed. 8.5 g 3   • Glucos-Chond-Hyal Ac-Ca Fructo (MOVE FREE JOINT HEALTH ADVANCE) Tab Take  by mouth every day.          Current Outpatient Prescriptions on File Prior to Visit   Medication Sig Dispense Refill   • acetaminophen (TYLENOL) 500 MG Tab Take 500-1,000 mg by mouth every 6 hours as needed.     • atenolol (TENORMIN) 50 MG Tab Take 1 Tab by mouth every day. 90 Tab 4   • furosemide (LASIX) 20 MG Tab Take 1 Tab by mouth every day. 90 Tab 4   • thyroid (ARMOUR THYROID) 120 MG Tab TAKE 1/2 TABLET ORALLY TWICE A DAY 90 Tab 4   • thyroid (ARMOUR THYROID) 90 MG Tab TAKE 1 TABLET BY MOUTH ONCE DAILY 90 Tab 4   • traZODone (DESYREL) 150 MG Tab Take 1 Tab by mouth every bedtime. 90 Tab 4   • liothyronine (CYTOMEL) 25 MCG Tab Take 1 Tab by mouth every day. 90 Tab 4   • nitrofurantoin (MACRODANTIN) 50 MG Cap Take 1 Cap by mouth 4 times a day. 360 Cap 4   • albuterol 108 (90 Base) MCG/ACT Aero Soln inhalation aerosol Inhale 2 Puffs by mouth every 6 hours as needed. 8.5 g 3   • Glucos-Chond-Hyal Ac-Ca Fructo (MOVE [employer] ADVANCE) Tab Take  by mouth every day.       No current facility-administered medications on file prior to visit.          Allergies:   Allergies   Allergen Reactions   • Alcohol Anaphylaxis     ETOH   • Asa [Aspirin] Anaphylaxis   • Bee Anaphylaxis   • Iodine Hives and Shortness of Breath     Contrast; \"will black out\"   • Shellfish Allergy Anaphylaxis   • Sulfa Drugs Anaphylaxis     \"will kill " "me\"   • Tetanus Antitoxin Nausea and Swelling     \"Swelling and violently ill\"; increased temp!!!!!!   • Tape Contact Dermatitis     Paper tape ok; will get welts and peeling skin with regular tape   • Influenza Vaccines Unspecified     Local reaction with deltoid pain swelling and swelling   • Other Environmental      Pt states that she is allergic \"to the sun\"   • Synthroid [Levothroid]      Nausea fatigue malaise       Social Hx:   Social History     Social History   • Marital status:      Spouse name: N/A   • Number of children: N/A   • Years of education: N/A     Occupational History   • Not on file.     Social History Main Topics   • Smoking status: Former Smoker     Packs/day: 1.00     Years: 4.00     Types: Cigarettes     Quit date: 1/1/1965   • Smokeless tobacco: Never Used   • Alcohol use No      Comment: ALLERGIC TO IT   • Drug use: No   • Sexual activity: Not Currently     Other Topics Concern   •  Service No   • Blood Transfusions No   • Caffeine Concern No   • Occupational Exposure No   • Hobby Hazards No   • Sleep Concern Yes   • Stress Concern No   • Weight Concern No   • Special Diet No   • Back Care No   • Exercise No   • Bike Helmet No   • Seat Belt Yes   • Self-Exams Yes     Social History Narrative   • No narrative on file         EXAMINATION     Physical Exam:   Vitals: Blood pressure 118/62, pulse 82, temperature 36.1 °C (97 °F), temperature source Temporal, height 1.6 m (5' 3\"), weight 75.8 kg (167 lb), SpO2 93 %, not currently breastfeeding.    Constitutional:   Body Habitus: Body mass index is 29.58 kg/m².  Cooperation: Fully cooperates with exam  Appearance: Well-groomed no disheveled    Respiratory-  breathing comfortable on room air, no audible wheezing  Cardiovascular- capillary refills less than 2 seconds. No lower extremity edema is noted.   Psychiatric- alert and oriented ×3. Normal affect.    MSK: -Limited range of motion of bilateral hips, improved when compared to " preprocedure.  FAIRs maneuver is negative bilaterally.  Strength is 5/5 in the bilateral lower externally's.  Straight straight leg raise is negative bilaterally.  Sensation is intact.          MEDICAL DECISION MAKING    DATA    Labs:   Lab Results   Component Value Date/Time    SODIUM 142 10/09/2018 06:24 AM    POTASSIUM 3.7 10/09/2018 06:24 AM    CHLORIDE 108 10/09/2018 06:24 AM    CO2 27 10/09/2018 06:24 AM    GLUCOSE 112 (H) 10/09/2018 06:24 AM    BUN 9 10/09/2018 06:24 AM    CREATININE 0.54 10/09/2018 06:24 AM    CREATININE 0.63 01/31/2013 08:06 AM    BUNCREATRAT 29 (H) 08/08/2013 08:11 AM    BUNCREATRAT 29 (H) 01/31/2013 08:06 AM        No results found for: PROTHROMBTM, INR     Lab Results   Component Value Date/Time    WBC 3.7 (L) 10/09/2018 06:24 AM    WBC 4.7 01/31/2013 08:06 AM    RBC 5.23 10/09/2018 06:24 AM    RBC 5.14 01/31/2013 08:06 AM    HEMOGLOBIN 14.2 10/09/2018 06:24 AM    HEMATOCRIT 44.3 10/09/2018 06:24 AM    MCV 84.7 10/09/2018 06:24 AM    MCV 81 01/31/2013 08:06 AM    MCH 27.2 10/09/2018 06:24 AM    MCH 27.6 01/31/2013 08:06 AM    MCHC 32.1 (L) 10/09/2018 06:24 AM    MPV 10.7 10/09/2018 06:24 AM    NEUTSPOLYS 51.50 10/09/2018 06:24 AM    LYMPHOCYTES 32.40 10/09/2018 06:24 AM    MONOCYTES 9.70 10/09/2018 06:24 AM    EOSINOPHILS 4.30 10/09/2018 06:24 AM    BASOPHILS 2.10 (H) 10/09/2018 06:24 AM    HYPOCHROMIA 1+ 02/18/2014 06:04 AM        Lab Results   Component Value Date/Time    HBA1C 5.8 (H) 02/18/2014 06:04 AM            DIAGNOSIS   Visit Diagnoses     ICD-10-CM   1. Type 2 diabetes mellitus without complication, unspecified whether long term insulin use (HCC) E11.9   2. Lumbar spondylosis M47.816   3. Chronic bilateral thoracic back pain M54.6    G89.29   4. Bilateral hip joint arthritis M16.0   5. Greater trochanteric bursitis of both hips M70.61    M70.62   6. Numbness of right foot R20.0         ASSESSMENT and PLAN:     Amanda Whitmore 68 y.o. female      Amanda Carr was seen  today for follow-up.    Diagnoses and all orders for this visit:    Type 2 diabetes mellitus without complication, unspecified whether long term insulin use (HCC)  Comments:  Controlled, limit steroid injections if possible.    Lumbar spondylosis  Comments:  Stable    Chronic bilateral thoracic back pain    Bilateral hip joint arthritis  Comments:  Significantly improved in terms of pain and function.  Patient is 90% improvement    Greater trochanteric bursitis of both hips  Comments:  Mild at this time, right worse than left.  Consider ultrasound-guided injection in the future.  Patient had excellent response to GT injections in the past    Numbness of right foot  Comments:  Possibly secondary to L5 radiculopathy.  Mild.  Not bothering the patient much at this time.  Monitor.  Consider MRI lumbar spine        -The patient is going on a cross[place]country trip in April 2019.  She states the long car rides typically give her significant amounts of hip pain.  I will follow-up with the patient in mid March and consider for a repeat injection prior to her trip.        Thank you for allowing me to participate in the care of this patient. If you have any questions please not hesitate to contact me.          Please note that this dictation was created using voice recognition software. I have made every reasonable attempt to correct obvious errors but there may be errors of grammar and content that I may have overlooked prior to finalization of this note.      Donal Ball MD  Interventional Spine and Sports Physiatry  Physical Medicine and Rehabilitation  [facility]  1/23/2019  10:39 AM

## 2019-01-23 NOTE — TELEPHONE ENCOUNTER
Pt called and she mentioned that she forgot to ask the result of her DX-Thoracic result dated 12/3/18.      Thank you  Dee Dee

## 2019-01-23 NOTE — TELEPHONE ENCOUNTER
Called Pt and LM in regards to her Dx-Thoracic as per Dr. Ball mentioned that it was normal.    Thank you  Dee Dee

## 2019-02-19 ENCOUNTER — PATIENT OUTREACH (OUTPATIENT)
Dept: HEALTH INFORMATION MANAGEMENT | Facility: OTHER | Age: 69
End: 2019-02-19

## 2019-02-19 NOTE — PROGRESS NOTES
Outcome: Left Message    Please transfer to Patient Outreach Team at [phone] when patient returns call.    WebIZ Checked & Epic Updated:  no    [facility] Verified: yes    Attempt # 1

## 2019-02-26 NOTE — PROGRESS NOTES
Outcome: Left Message    Please transfer to Patient Outreach Team at [phone] when patient returns call.      Attempt # 2

## 2019-03-12 ENCOUNTER — OFFICE VISIT (OUTPATIENT)
Dept: PHYSICAL MEDICINE AND REHAB | Facility: MEDICAL CENTER | Age: 69
End: 2019-03-12
Payer: MEDICARE

## 2019-03-12 VITALS
WEIGHT: 168 LBS | BODY MASS INDEX: 29.77 KG/M2 | SYSTOLIC BLOOD PRESSURE: 122 MMHG | HEART RATE: 71 BPM | TEMPERATURE: 97.7 F | DIASTOLIC BLOOD PRESSURE: 68 MMHG | HEIGHT: 63 IN | OXYGEN SATURATION: 94 %

## 2019-03-12 DIAGNOSIS — M79.10 MYALGIA: ICD-10-CM

## 2019-03-12 DIAGNOSIS — R20.0 NUMBNESS OF RIGHT FOOT: ICD-10-CM

## 2019-03-12 DIAGNOSIS — E11.9 TYPE 2 DIABETES MELLITUS WITHOUT COMPLICATION, UNSPECIFIED WHETHER LONG TERM INSULIN USE (HCC): ICD-10-CM

## 2019-03-12 DIAGNOSIS — M16.0 BILATERAL HIP JOINT ARTHRITIS: ICD-10-CM

## 2019-03-12 DIAGNOSIS — M54.6 CHRONIC BILATERAL THORACIC BACK PAIN: ICD-10-CM

## 2019-03-12 DIAGNOSIS — M47.816 LUMBAR SPONDYLOSIS: ICD-10-CM

## 2019-03-12 DIAGNOSIS — M70.62 GREATER TROCHANTERIC BURSITIS OF BOTH HIPS: ICD-10-CM

## 2019-03-12 DIAGNOSIS — G89.29 CHRONIC BILATERAL THORACIC BACK PAIN: ICD-10-CM

## 2019-03-12 DIAGNOSIS — M70.61 GREATER TROCHANTERIC BURSITIS OF BOTH HIPS: ICD-10-CM

## 2019-03-12 DIAGNOSIS — R73.01 IMPAIRED FASTING GLUCOSE: ICD-10-CM

## 2019-03-12 PROCEDURE — 99214 OFFICE O/P EST MOD 30 MIN: CPT | Performed by: PHYSICAL MEDICINE & REHABILITATION

## 2019-03-12 ASSESSMENT — PAIN SCALES - GENERAL: PAINLEVEL: 10=SEVERE PAIN

## 2019-03-12 NOTE — PATIENT INSTRUCTIONS
Your procedure will be at the [facility] special procedure suite.    [facility] [place] [address]       PRE-PROCEDURE INSTRUCTIONS  You may take your regular medications except:   · No Anti-inflammatories 5 days prior to your procedure. Anti-inflammatories include medicines such as  ibuprofen (Motrin, Advil), Excedrin, Naproxen (Aleve, Anaprox, Naprelan, Naprosyn), Celecoxib (Celebrex), Diclofenac (Voltaren-XR tab), and Meloxicam (Mobic).   · No Glucophage or Metformin 24 hours before your procedure. You may resume next day after your procedure.  · Call the physiatry office if you are taking or prescribed anti-biotics within five days of procedure.  · Please ask provider if you are taking any new diabetes medication.  · CONTINUE TAKING BLOOD PRESSURE MEDICATIONS AS PRESCRIBED.  · Pain medications will not be prescribed on the procedure day. Procedural pain medication may be used by your provider   · Call your doctor's office performing the procedure if you have a fever, chills, rash or new illness prior to your procedure    Anticoagulation/antiplatelet medications  No Blood thinning medications such as Coumadin or Plavix 5 days prior to procedure unless your doctor said to continue these medications. Call your doctor if a new medication is prescribed in this class.     Restrictions for eating before procedure:   · If you are getting procedural sedation, then do not eat to for 8 hours prior to procedure appointment time. Do not drink fluids for four hours prior to your procedure time.   · If you are not having procedural sedation, then Skip the meal prior to your procedure. If you have a morning procedure then skip breakfast. If you have an afternoon procedure then skip lunch.   · You may drink clear liquids up to 2 hours prior to your procedure  · You must have a  the day of procedure to accompany you home.      POST PROCEDURE INSTRUCTIONS   · No heavy lifting, strenuous bending or  strenuous exercise for 3 days after your procedure.  · No hot tubs, baths, swimming for 3 days after your procedure  · You can remove the bandage the day after the procedure.  · If you received a steroid injection  · please note that there may be a delay for the injection to start working, the delay may be up to two weeks.   · If you experience severe pain or prolonged weakness longer than one day, bowel or bladder incontinence then please call the physiatry office.   · If you have diabetes, please note that your sugar levels may be elevated for 1-2 days after a steroid injection.   · Your leg may feel heavy, weak and numb for up to 1-2 days. Be very careful walking.   ·  You may resume normal activities 3 days after procedure.

## 2019-03-12 NOTE — PROGRESS NOTES
Follow up patient note  Interventional spine and sports physiatry, Physical medicine rehabilitation      Chief complaint:   Chief Complaint   Patient presents with   • Follow-Up     Type 2 diabetes mellitus without complication, unspecified whether long term insulin use ([facility])          HISTORY    Please see new patient note dated  by Dr Ball,  for more details.     HPI  Patient identification: Amanda Whitmore 68 y.o. female  With Diagnoses of Type 2 diabetes mellitus without complication, unspecified whether long term insulin use ([facility]), Lumbar spondylosis, Chronic bilateral thoracic back pain, Bilateral hip joint arthritis, Greater trochanteric bursitis of both hips, Numbness of right foot, Impaired fasting glucose, and Myalgia were pertinent to this visit.       The patient states the pain is been starting to come back in the bilateral hips in the groin and the lateral hip region.  Previously the patient was getting bilateral greater trochanteric bursa injections and intra-articular hip injections at the same time and was getting approximately 6-month pain relief.  With this pain relief the patient was able to resume her walking and exercise program and had a significant improvement in her ADLs.  Currently the patient has moderate to severe pain in the bilateral hips and trochanteric regions, nonradiating, aching quality, constant.  Worse with mobility.    Also associated bilateral low back pain which is difficult for the patient is a is radiating the lateral hips.  Associated numbness in the foot.  ROS Red Flags :   -  Fever, Chills, Sweats: Denies  Involuntary Weight Loss: Denies  Bowel/Bladder Incontinence: Denies  Saddle Anesthesia: Denies        PMHx:   Past Medical History:   Diagnosis Date   • Anesthesia     vertigo, PONV has trouble clearing anesthesia secondary to decreased kidney function   • Arthritis     hips, hands, knees   • Bronchitis 6/2014   • Cancer (HCC) 2015    brain, radiation March  2015   • Coughing blood    • GERD (gastroesophageal reflux disease)     on med   • Heart burn    • Hiatus hernia syndrome    • Hypertension    • Indigestion    • Pain 04/16/14    bilateral hips 4/10   • Pneumonia 12/2013   • Renal disorder     chronic insufficiency 40-80% fx (per pt; most recent BUN/Creat were WNL); gets kidney infections easily   • Sleep apnea     uses breath right strips   • Snoring    • Thyroid disease    • Type II or unspecified type diabetes mellitus without mention of complication, not stated as uncontrolled     Diet controlled       PSHx:   Past Surgical History:   Procedure Laterality Date   • BUNIONECTOMY DIXIE Right 12/15/2016    Procedure: BUNIONECTOMY DIXIE - BRIAN;  Surgeon: Shankar Armas D.P.M.;  Location: SURGERY [facility];  Service:    • JOINT INJECTION DIAGNOSTIC  4/16/2015    Performed by Frank Robertson M.D. at SURGERY [facility]   • JOINT INJECTION DIAGNOSTIC  8/28/2014    Performed by Frank Robertson M.D. at SURGERY [facility]   • JOINT INJECTION DIAGNOSTIC  4/17/2014    Performed by Frank Robertson M.D. at SURGERY [facility]   • CHOLECYSTECTOMY  1998   • ABDOMINAL HYSTERECTOMY TOTAL  1980's   • TONSILLECTOMY      as a child       Family history   Family History   Problem Relation Age of Onset   • Heart Attack Mother    • Cancer Father    • Hypertension Father    • Stroke Father          Medications:   Outpatient Prescriptions Marked as Taking for the 3/12/19 encounter (Office Visit) with Donal Ball M.D.   Medication Sig Dispense Refill   • acetaminophen (TYLENOL) 500 MG Tab Take 500-1,000 mg by mouth every 6 hours as needed.     • atenolol (TENORMIN) 50 MG Tab Take 1 Tab by mouth every day. 90 Tab 4   • furosemide (LASIX) 20 MG Tab Take 1 Tab by mouth every day. 90 Tab 4   • thyroid (ARMOUR THYROID) 120 MG Tab TAKE 1/2 TABLET ORALLY TWICE A DAY 90 Tab 4   • thyroid (ARMOUR THYROID) 90 MG Tab TAKE 1 TABLET BY MOUTH ONCE DAILY 90 Tab 4   •  "traZODone (DESYREL) 150 MG Tab Take 1 Tab by mouth every bedtime. 90 Tab 4   • liothyronine (CYTOMEL) 25 MCG Tab Take 1 Tab by mouth every day. 90 Tab 4   • nitrofurantoin (MACRODANTIN) 50 MG Cap Take 1 Cap by mouth 4 times a day. 360 Cap 4   • albuterol 108 (90 Base) MCG/ACT Aero Soln inhalation aerosol Inhale 2 Puffs by mouth every 6 hours as needed. 8.5 g 3   • Glucos-Chond-Hyal Ac-Ca Fructo ([facility] ADVANCE) Tab Take  by mouth every day.          Current Outpatient Prescriptions on File Prior to Visit   Medication Sig Dispense Refill   • acetaminophen (TYLENOL) 500 MG Tab Take 500-1,000 mg by mouth every 6 hours as needed.     • atenolol (TENORMIN) 50 MG Tab Take 1 Tab by mouth every day. 90 Tab 4   • furosemide (LASIX) 20 MG Tab Take 1 Tab by mouth every day. 90 Tab 4   • thyroid (ARMOUR THYROID) 120 MG Tab TAKE 1/2 TABLET ORALLY TWICE A DAY 90 Tab 4   • thyroid (ARMOUR THYROID) 90 MG Tab TAKE 1 TABLET BY MOUTH ONCE DAILY 90 Tab 4   • traZODone (DESYREL) 150 MG Tab Take 1 Tab by mouth every bedtime. 90 Tab 4   • liothyronine (CYTOMEL) 25 MCG Tab Take 1 Tab by mouth every day. 90 Tab 4   • nitrofurantoin (MACRODANTIN) 50 MG Cap Take 1 Cap by mouth 4 times a day. 360 Cap 4   • albuterol 108 (90 Base) MCG/ACT Aero Soln inhalation aerosol Inhale 2 Puffs by mouth every 6 hours as needed. 8.5 g 3   • Glucos-Chond-Hyal Ac-Ca Fructo ([facility] ADVANCE) Tab Take  by mouth every day.       No current facility-administered medications on file prior to visit.          Allergies:   Allergies   Allergen Reactions   • Alcohol Anaphylaxis     ETOH   • Asa [Aspirin] Anaphylaxis   • Bee Anaphylaxis   • Iodine Hives and Shortness of Breath     Contrast; \"will black out\"   • Shellfish Allergy Anaphylaxis   • Sulfa Drugs Anaphylaxis     \"will kill me\"   • Tetanus Antitoxin Nausea and Swelling     \"Swelling and violently ill\"; increased temp!!!!!!   • Tape Contact Dermatitis     Paper tape ok; will get " "welts and peeling skin with regular tape   • Influenza Vaccines Unspecified     Local reaction with deltoid pain swelling and swelling   • Other Environmental      Pt states that she is allergic \"to the sun\"   • Synthroid [Levothroid]      Nausea fatigue malaise       Social Hx:   Social History     Social History   • Marital status:      Spouse name: N/A   • Number of children: N/A   • Years of education: N/A     Occupational History   • Not on file.     Social History Main Topics   • Smoking status: Former Smoker     Packs/day: 1.00     Years: 4.00     Types: Cigarettes     Quit date: 1/1/1965   • Smokeless tobacco: Never Used   • Alcohol use No      Comment: ALLERGIC TO IT   • Drug use: No   • Sexual activity: Not Currently     Other Topics Concern   •  Service No   • Blood Transfusions No   • Caffeine Concern No   • Occupational Exposure No   • Hobby Hazards No   • Sleep Concern Yes   • Stress Concern No   • Weight Concern No   • Special Diet No   • Back Care No   • Exercise No   • Bike Helmet No   • Seat Belt Yes   • Self-Exams Yes     Social History Narrative   • No narrative on file         EXAMINATION     Physical Exam:   Vitals: Blood pressure 122/68, pulse 71, temperature 36.5 °C (97.7 °F), temperature source Temporal, height 1.6 m (5' 3\"), weight 76.2 kg (168 lb), SpO2 94 %, not currently breastfeeding.    Constitutional:   Body Habitus: Body mass index is 29.76 kg/m².  Cooperation: Fully cooperates with exam  Appearance: Well-groomed no disheveled    Respiratory-  breathing comfortable on room air, no audible wheezing  Cardiovascular- capillary refills less than 2 seconds. No lower extremity edema is noted.   Psychiatric- alert and oriented ×3. Normal affect.    MSK: -Strength is 5/5 in the bilateral lower externally's.  FAIRs maneuver is positive bilaterally.  Tenderness palpation of the bilateral greater trochanters.          MEDICAL DECISION MAKING    DATA    Labs:   Lab Results "   Component Value Date/Time    SODIUM 142 10/09/2018 06:24 AM    POTASSIUM 3.7 10/09/2018 06:24 AM    CHLORIDE 108 10/09/2018 06:24 AM    CO2 27 10/09/2018 06:24 AM    GLUCOSE 112 (H) 10/09/2018 06:24 AM    BUN 9 10/09/2018 06:24 AM    CREATININE 0.54 10/09/2018 06:24 AM    CREATININE 0.63 01/31/2013 08:06 AM    BUNCREATRAT 29 (H) 08/08/2013 08:11 AM    BUNCREATRAT 29 (H) 01/31/2013 08:06 AM        No results found for: PROTHROMBTM, INR     Lab Results   Component Value Date/Time    WBC 3.7 (L) 10/09/2018 06:24 AM    WBC 4.7 01/31/2013 08:06 AM    RBC 5.23 10/09/2018 06:24 AM    RBC 5.14 01/31/2013 08:06 AM    HEMOGLOBIN 14.2 10/09/2018 06:24 AM    HEMATOCRIT 44.3 10/09/2018 06:24 AM    MCV 84.7 10/09/2018 06:24 AM    MCV 81 01/31/2013 08:06 AM    MCH 27.2 10/09/2018 06:24 AM    MCH 27.6 01/31/2013 08:06 AM    MCHC 32.1 (L) 10/09/2018 06:24 AM    MPV 10.7 10/09/2018 06:24 AM    NEUTSPOLYS 51.50 10/09/2018 06:24 AM    LYMPHOCYTES 32.40 10/09/2018 06:24 AM    MONOCYTES 9.70 10/09/2018 06:24 AM    EOSINOPHILS 4.30 10/09/2018 06:24 AM    BASOPHILS 2.10 (H) 10/09/2018 06:24 AM    HYPOCHROMIA 1+ 02/18/2014 06:04 AM        Lab Results   Component Value Date/Time    HBA1C 5.8 (H) 02/18/2014 06:04 AM            DIAGNOSIS   Visit Diagnoses     ICD-10-CM   1. Type 2 diabetes mellitus without complication, unspecified whether long term insulin use (HCC) E11.9   2. Lumbar spondylosis M47.816   3. Chronic bilateral thoracic back pain M54.6    G89.29   4. Bilateral hip joint arthritis M16.0   5. Greater trochanteric bursitis of both hips M70.61    M70.62   6. Numbness of right foot R20.0   7. Impaired fasting glucose R73.01   8. Myalgia M79.10         ASSESSMENT and PLAN:     Amanda Carr Haim 68 y.o. female      Amanda Carr was seen today for follow-up.    Diagnoses and all orders for this visit:    Type 2 diabetes mellitus without complication, unspecified whether long term insulin use (HCC)    Lumbar  spondylosis    Chronic bilateral thoracic back pain    Bilateral hip joint arthritis  -     REFERRAL TO PHYSICIAL MEDICINE REHAB    Greater trochanteric bursitis of both hips  -     REFERRAL TO PHYSICIAL MEDICINE REHAB    Numbness of right foot    Impaired fasting glucose    Myalgia      Is atypical for me to do bilateral hip joint injections and bilateral greater trochanteric bursa injections at the same time however the patient has had significant pain relief this for up to 6 months at a time for quite a long time with significant functional improvement while these injections were active.  I did a previous injection of the bilateral hip joints in January 2019 and the patient had 90% pain relief for approximately 2 months.  She has an upcoming cross[place] trip in April 2019 and usually significant amounts of time in the car gives her significant hip pain.  Because of this I think it is reasonable to do bilateral hip joint injections and right bilateral greater trochanteric bursa injections at the same time which I have scheduled.    The risks benefits and alternatives to this procedure were discussed and the patient wishes to proceed with the procedure. Risks include but are not limited to damage to surrounding structures, infection, bleeding, worsening of pain which can be permanent, weakness which can be permanent. Benefits include pain relief, improved function. Alternatives includes not doing the procedure.      Should these injections failed and I would consider workup for the patient's lumbar spine as an etiology of this pain with an MRI of the lumbar spine.    Thank you for allowing me to participate in the care of this patient. If you have any questions please not hesitate to contact me.          Please note that this dictation was created using voice recognition software. I have made every reasonable attempt to correct obvious errors but there may be errors of grammar and content that I may have  overlooked prior to finalization of this note.      Donal Ball MD  Interventional Spine and Sports Physiatry  Physical Medicine and Rehabilitation  [facility]  3/12/2019  12:52 PM

## 2019-03-20 ENCOUNTER — HOSPITAL ENCOUNTER (OUTPATIENT)
Dept: RADIOLOGY | Facility: REHABILITATION | Age: 69
End: 2019-03-20
Attending: PHYSICAL MEDICINE & REHABILITATION

## 2019-03-20 ENCOUNTER — HOSPITAL ENCOUNTER (OUTPATIENT)
Dept: PAIN MANAGEMENT | Facility: REHABILITATION | Age: 69
End: 2019-03-20
Attending: PHYSICAL MEDICINE & REHABILITATION
Payer: MEDICARE

## 2019-03-20 VITALS
DIASTOLIC BLOOD PRESSURE: 80 MMHG | HEART RATE: 76 BPM | WEIGHT: 167 LBS | SYSTOLIC BLOOD PRESSURE: 165 MMHG | RESPIRATION RATE: 16 BRPM | HEIGHT: 63 IN | TEMPERATURE: 97.4 F | BODY MASS INDEX: 29.59 KG/M2 | OXYGEN SATURATION: 96 %

## 2019-03-20 PROCEDURE — 700111 HCHG RX REV CODE 636 W/ 250 OVERRIDE (IP)

## 2019-03-20 PROCEDURE — 20611 DRAIN/INJ JOINT/BURSA W/US: CPT

## 2019-03-20 PROCEDURE — 700117 HCHG RX CONTRAST REV CODE 255

## 2019-03-20 PROCEDURE — A9585 GADOBUTROL INJECTION: HCPCS

## 2019-03-20 PROCEDURE — 20610 DRAIN/INJ JOINT/BURSA W/O US: CPT

## 2019-03-20 RX ORDER — DEXAMETHASONE SODIUM PHOSPHATE 10 MG/ML
INJECTION, SOLUTION INTRAMUSCULAR; INTRAVENOUS
Status: COMPLETED
Start: 2019-03-20 | End: 2019-03-20

## 2019-03-20 RX ORDER — LIDOCAINE HYDROCHLORIDE 10 MG/ML
INJECTION, SOLUTION EPIDURAL; INFILTRATION; INTRACAUDAL; PERINEURAL
Status: COMPLETED
Start: 2019-03-20 | End: 2019-03-20

## 2019-03-20 RX ORDER — GADOBUTROL 604.72 MG/ML
INJECTION INTRAVENOUS
Status: COMPLETED
Start: 2019-03-20 | End: 2019-03-20

## 2019-03-20 RX ADMIN — DEXAMETHASONE SODIUM PHOSPHATE 20 MG: 10 INJECTION, SOLUTION INTRAMUSCULAR; INTRAVENOUS at 13:12

## 2019-03-20 RX ADMIN — GADOBUTROL 3 ML: 604.72 INJECTION INTRAVENOUS at 13:11

## 2019-03-20 RX ADMIN — DEXAMETHASONE SODIUM PHOSPHATE 10 MG: 10 INJECTION, SOLUTION INTRAMUSCULAR; INTRAVENOUS at 13:16

## 2019-03-20 RX ADMIN — LIDOCAINE HYDROCHLORIDE 20 ML: 10 INJECTION, SOLUTION EPIDURAL; INFILTRATION; INTRACAUDAL; PERINEURAL at 13:12

## 2019-03-20 NOTE — ADDENDUM NOTE
Encounter addended by: Donal Ball M.D. on: 3/20/2019  1:21 PM<BR>    Actions taken: Sign clinical note

## 2019-03-20 NOTE — PROCEDURES
Date of Service: 3/20/2019      Patient: Amanda Whitmore 68 y.o. female     MRN: [id number]      Physician/s: Donal Ball MD     Pre-operative Diagnosis: bilateral   hip osteoarthritis     Post-operative Diagnosis: bilateral hip osteoarthritis     Procedure: bilateral  intra-articular Hip injection with fluoroscopic guidance     Description of procedure:     The risks, benefits, and alternatives of the procedure were reviewed and discussed with the patient.  Written informed consent was freely obtained. A pre-procedural time-out was conducted by the physician verifying patient’s identity, procedure to be performed, procedure site and side, and allergy verification. Appropriate equipment was determined to be in place for the procedure.      The femoral artery nerve and vein as well as the inguinal ligament were identified with ultrasound and marked with a marking pen on both hip joints.  The entire procedure took place lateral to the femoral vessels and nerve and inferior to the inguinal ligament.     A 27-gauge 1.5 inch needle was used with approximately 2 mL of 1% lidocaine for local anesthesia at the junction of the femoral head and neck.}  A 25g 3.5 inch needle was placed into skin and advanced under fluoroscopic guidance into the left hip joint space at the head neck junction with an anterior approach. 4 mL of contrast was used to clearly demonstrate the outlining of the joint capsule. Following negative aspiration, approx 5cc of 1% lidocaine with 4mg/mL dexamethasone was then injected into the joint, and the needle was subsequently removed intact after restyleted. The patient's hip was wiped with a 4x4 gauze, the area was cleansed with alcohol prep, and a bandaid was applied. There were no complications noted.     The left greater trochanter was palpated.  A 25-gauge 3.5 inch needle was used and advanced straight down towards the greater trochanter and to loss was felt.  This was confirmed with  fluoroscopic guidance.  I then injected 0.5 mL of 10 mg/mL of dexamethasone and 4 mL of 1% lidocaine      In the procedure suite the patient was placed in a supine position with and the skin was prepped and draped in the usual sterile fashion. A 27-gauge 1.5 inch needle was used with approximately 2 mL of 1% lidocaine for local anesthesia at the junction of the femoral head and neck.}  A 25g 3.5 inch needle was placed into skin and advanced under fluoroscopic guidance into the right hip joint space at the head neck junction with an anterior approach. 4 mL of contrast was used to clearly demonstrate the outlining of the joint capsule. Following negative aspiration, approx 5cc of 1% lidocaine with 4mg/mL dexamethasone was then injected into the joint, and the needle was subsequently removed intact after restyleted.        The right greater trochanter was palpated.  A 25-gauge 3.5 inch needle was used and advanced straight down towards the greater trochanter and to loss was felt.  This was confirmed with fluoroscopic guidance.  I then injected 0.5 mL of 10 mg/mL of dexamethasone and 4 mL of 1% lidocaine                  Donal Ball MD  Physical Medicine and Rehabilitation  Interventional Spine and Sports Physiatry  [facility]              CPT  Major joint/bursa:  20610x 2 -50  For hip injections  Major joint/bursa:  20610x 2 -50  For greater trochanters  Fluoroscopic  needle guidance [id number]

## 2019-03-20 NOTE — NON-PROVIDER
Reviewed Plan of Care with patient. Confirmed that she does not take any blood thinning medications.  Confirmed that she has a . Reviewed home care instructions with patient prior to procedure. All questions and concerns addressed.   Dr Ball in to evaluate patient.

## 2019-04-02 ENCOUNTER — HOSPITAL ENCOUNTER (OUTPATIENT)
Dept: RADIOLOGY | Facility: MEDICAL CENTER | Age: 69
End: 2019-04-02
Attending: PHYSICAL MEDICINE & REHABILITATION
Payer: MEDICARE

## 2019-04-02 ENCOUNTER — OFFICE VISIT (OUTPATIENT)
Dept: PHYSICAL MEDICINE AND REHAB | Facility: MEDICAL CENTER | Age: 69
End: 2019-04-02
Payer: MEDICARE

## 2019-04-02 VITALS
OXYGEN SATURATION: 96 % | HEIGHT: 63 IN | DIASTOLIC BLOOD PRESSURE: 70 MMHG | HEART RATE: 85 BPM | BODY MASS INDEX: 30.12 KG/M2 | SYSTOLIC BLOOD PRESSURE: 114 MMHG | WEIGHT: 170 LBS | TEMPERATURE: 96.7 F

## 2019-04-02 DIAGNOSIS — M70.62 GREATER TROCHANTERIC BURSITIS OF BOTH HIPS: ICD-10-CM

## 2019-04-02 DIAGNOSIS — M70.61 GREATER TROCHANTERIC BURSITIS OF BOTH HIPS: ICD-10-CM

## 2019-04-02 DIAGNOSIS — M54.50 ACUTE RIGHT-SIDED LOW BACK PAIN WITHOUT SCIATICA: ICD-10-CM

## 2019-04-02 DIAGNOSIS — M47.816 LUMBAR SPONDYLOSIS: ICD-10-CM

## 2019-04-02 DIAGNOSIS — M79.10 MYALGIA: ICD-10-CM

## 2019-04-02 DIAGNOSIS — E11.9 TYPE 2 DIABETES MELLITUS WITHOUT COMPLICATION, UNSPECIFIED WHETHER LONG TERM INSULIN USE (HCC): ICD-10-CM

## 2019-04-02 DIAGNOSIS — R20.0 NUMBNESS OF RIGHT FOOT: ICD-10-CM

## 2019-04-02 DIAGNOSIS — M16.0 BILATERAL HIP JOINT ARTHRITIS: ICD-10-CM

## 2019-04-02 PROCEDURE — 72100 X-RAY EXAM L-S SPINE 2/3 VWS: CPT

## 2019-04-02 PROCEDURE — 99214 OFFICE O/P EST MOD 30 MIN: CPT | Performed by: PHYSICAL MEDICINE & REHABILITATION

## 2019-04-02 ASSESSMENT — PAIN SCALES - GENERAL: PAINLEVEL: 10=SEVERE PAIN

## 2019-04-02 NOTE — PROGRESS NOTES
Follow up patient note  Interventional spine and sports physiatry, Physical medicine rehabilitation      Chief complaint:   Chief Complaint   Patient presents with   • Follow-Up     back pain          HISTORY    Please see new patient note dated  by Dr Ball,  for more details.     HPI  Patient identification: Amanda Whitmore 68 y.o. female  With Diagnoses of Type 2 diabetes mellitus without complication, unspecified whether long term insulin use (HCC), Greater trochanteric bursitis of both hips, Bilateral hip joint arthritis, Numbness of right foot, Myalgia, Lumbar spondylosis, and Acute right-sided low back pain without sciatica were pertinent to this visit.     Recent history of bilateral interarticular hip injections greater trochanteric bursa injections the patient notes that the hip pain and lateral hip pain is significantly improved and now resolved after these injections.  She has had excellent results with these in the past.    Patient is complaining of right low back pain, minimally improved with tylenol, worse with movement and twisting. Patient is not walking because of this pain. This is a new pain to the patient. No falls or trauma. Pain is severe and sometimes radiates down the posterolateral aspect of the right leg. Intermittent numbness on the posterior aspect of the patient's leg. The patient notes that this happened acutely while vacuuming.       ROS Red Flags :   Fever, Chills, Sweats: Denies  Involuntary Weight Loss: Denies  Bowel/Bladder Incontinence: Denies  Saddle Anesthesia: Denies        PMHx:   Past Medical History:   Diagnosis Date   • Anesthesia     vertigo, PONV has trouble clearing anesthesia secondary to decreased kidney function   • Arthritis     hips, hands, knees   • Bronchitis 6/2014   • Cancer (HCC) 2015    brain, radiation March 2015   • Coughing blood    • GERD (gastroesophageal reflux disease)     on med   • Heart burn    • Hiatus hernia syndrome    • Hypertension    •  Indigestion    • Pain 04/16/14    bilateral hips 4/10   • Pneumonia 12/2013   • Renal disorder     chronic insufficiency 40-80% fx (per pt; most recent BUN/Creat were WNL); gets kidney infections easily   • Sleep apnea     uses breath right strips   • Snoring    • Thyroid disease    • Type II or unspecified type diabetes mellitus without mention of complication, not stated as uncontrolled     Diet controlled       PSHx:   Past Surgical History:   Procedure Laterality Date   • BUNIONECTOMY DIXIE Right 12/15/2016    Procedure: BUNIONECTOMY DIXIE - BRIAN;  Surgeon: Shankar Armas D.P.M.;  Location: SURGERY [facility];  Service:    • JOINT INJECTION DIAGNOSTIC  4/16/2015    Performed by Frank Robertson M.D. at SURGERY [facility]   • JOINT INJECTION DIAGNOSTIC  8/28/2014    Performed by Frank Robertson M.D. at SURGERY [facility]   • JOINT INJECTION DIAGNOSTIC  4/17/2014    Performed by Frank Robertson M.D. at SURGERY [facility]   • CHOLECYSTECTOMY  1998   • ABDOMINAL HYSTERECTOMY TOTAL  1980's   • TONSILLECTOMY      as a child       Family history   Family History   Problem Relation Age of Onset   • Heart Attack Mother    • Cancer Father    • Hypertension Father    • Stroke Father          Medications:   Outpatient Prescriptions Marked as Taking for the 4/2/19 encounter (Office Visit) with Donal Ball M.D.   Medication Sig Dispense Refill   • acetaminophen (TYLENOL) 500 MG Tab Take 500-1,000 mg by mouth every 6 hours as needed.     • atenolol (TENORMIN) 50 MG Tab Take 1 Tab by mouth every day. 90 Tab 4   • furosemide (LASIX) 20 MG Tab Take 1 Tab by mouth every day. 90 Tab 4   • thyroid (ARMOUR THYROID) 120 MG Tab TAKE 1/2 TABLET ORALLY TWICE A DAY 90 Tab 4   • thyroid (ARMOUR THYROID) 90 MG Tab TAKE 1 TABLET BY MOUTH ONCE DAILY 90 Tab 4   • traZODone (DESYREL) 150 MG Tab Take 1 Tab by mouth every bedtime. 90 Tab 4   • liothyronine (CYTOMEL) 25 MCG Tab Take 1 Tab by mouth every day. 90  "Tab 4   • albuterol 108 (90 Base) MCG/ACT Aero Soln inhalation aerosol Inhale 2 Puffs by mouth every 6 hours as needed. 8.5 g 3   • Glucos-Chond-Hyal Ac-Ca Fructo (MOVE FREE JOINT HEALTH ADVANCE) Tab Take  by mouth every day.          Current Outpatient Prescriptions on File Prior to Visit   Medication Sig Dispense Refill   • acetaminophen (TYLENOL) 500 MG Tab Take 500-1,000 mg by mouth every 6 hours as needed.     • atenolol (TENORMIN) 50 MG Tab Take 1 Tab by mouth every day. 90 Tab 4   • furosemide (LASIX) 20 MG Tab Take 1 Tab by mouth every day. 90 Tab 4   • thyroid (ARMOUR THYROID) 120 MG Tab TAKE 1/2 TABLET ORALLY TWICE A DAY 90 Tab 4   • thyroid (ARMOUR THYROID) 90 MG Tab TAKE 1 TABLET BY MOUTH ONCE DAILY 90 Tab 4   • traZODone (DESYREL) 150 MG Tab Take 1 Tab by mouth every bedtime. 90 Tab 4   • liothyronine (CYTOMEL) 25 MCG Tab Take 1 Tab by mouth every day. 90 Tab 4   • albuterol 108 (90 Base) MCG/ACT Aero Soln inhalation aerosol Inhale 2 Puffs by mouth every 6 hours as needed. 8.5 g 3   • Glucos-Chond-Hyal Ac-Ca Fructo (naaptol ADVANCE) Tab Take  by mouth every day.     • nitrofurantoin (MACRODANTIN) 50 MG Cap Take 1 Cap by mouth 4 times a day. (Patient not taking: Reported on 4/2/2019) 360 Cap 4     No current facility-administered medications on file prior to visit.          Allergies:   Allergies   Allergen Reactions   • Alcohol Anaphylaxis     ETOH   • Asa [Aspirin] Anaphylaxis   • Bee Anaphylaxis   • Iodine Hives and Shortness of Breath     Contrast; \"will black out\"   • Shellfish Allergy Anaphylaxis   • Sulfa Drugs Anaphylaxis     \"will kill me\"   • Tetanus Antitoxin Nausea and Swelling     \"Swelling and violently ill\"; increased temp!!!!!!   • Tape Contact Dermatitis     Paper tape ok; will get welts and peeling skin with regular tape   • Influenza Vaccines Unspecified     Local reaction with deltoid pain swelling and swelling   • Other Environmental      Pt states that she is allergic " "\"to the sun\"   • Synthroid [Levothroid]      Nausea fatigue malaise       Social Hx:   Social History     Social History   • Marital status:      Spouse name: N/A   • Number of children: N/A   • Years of education: N/A     Occupational History   • Not on file.     Social History Main Topics   • Smoking status: Former Smoker     Packs/day: 1.00     Years: 4.00     Types: Cigarettes     Quit date: 1/1/1965   • Smokeless tobacco: Never Used   • Alcohol use No      Comment: ALLERGIC TO IT   • Drug use: No   • Sexual activity: Not Currently     Other Topics Concern   •  Service No   • Blood Transfusions No   • Caffeine Concern No   • Occupational Exposure No   • Hobby Hazards No   • Sleep Concern Yes   • Stress Concern No   • Weight Concern No   • Special Diet No   • Back Care No   • Exercise No   • Bike Helmet No   • Seat Belt Yes   • Self-Exams Yes     Social History Narrative   • No narrative on file         EXAMINATION     Physical Exam:   Vitals: Blood pressure 114/70, pulse 85, temperature 35.9 °C (96.7 °F), temperature source Temporal, height 1.6 m (5' 3\"), weight 77.1 kg (170 lb), SpO2 96 %, not currently breastfeeding.    Constitutional:   Body Habitus: Body mass index is 30.11 kg/m².  Cooperation: Fully cooperates with exam  Appearance: Well-groomed no disheveled    Respiratory-  breathing comfortable on room air, no audible wheezing  Cardiovascular- capillary refills less than 2 seconds. No lower extremity edema is noted.   Psychiatric- alert and oriented ×3. Normal affect.    MSK: -Strength is 5/5 in the bilateral lower extremities.  Straight leg raise is positive on the right, negative on the left.  FAIRs maneuver is negative bilaterally.  No tenderness palpation of the bilateral greater trochanters.        MEDICAL DECISION MAKING    DATA    Labs:   Lab Results   Component Value Date/Time    SODIUM 142 10/09/2018 06:24 AM    POTASSIUM 3.7 10/09/2018 06:24 AM    CHLORIDE 108 10/09/2018 06:24 AM "    CO2 27 10/09/2018 06:24 AM    GLUCOSE 112 (H) 10/09/2018 06:24 AM    BUN 9 10/09/2018 06:24 AM    CREATININE 0.54 10/09/2018 06:24 AM    CREATININE 0.63 01/31/2013 08:06 AM    BUNCREATRAT 29 (H) 08/08/2013 08:11 AM    BUNCREATRAT 29 (H) 01/31/2013 08:06 AM        No results found for: PROTHROMBTM, INR     Lab Results   Component Value Date/Time    WBC 3.7 (L) 10/09/2018 06:24 AM    WBC 4.7 01/31/2013 08:06 AM    RBC 5.23 10/09/2018 06:24 AM    RBC 5.14 01/31/2013 08:06 AM    HEMOGLOBIN 14.2 10/09/2018 06:24 AM    HEMATOCRIT 44.3 10/09/2018 06:24 AM    MCV 84.7 10/09/2018 06:24 AM    MCV 81 01/31/2013 08:06 AM    MCH 27.2 10/09/2018 06:24 AM    MCH 27.6 01/31/2013 08:06 AM    MCHC 32.1 (L) 10/09/2018 06:24 AM    MPV 10.7 10/09/2018 06:24 AM    NEUTSPOLYS 51.50 10/09/2018 06:24 AM    LYMPHOCYTES 32.40 10/09/2018 06:24 AM    MONOCYTES 9.70 10/09/2018 06:24 AM    EOSINOPHILS 4.30 10/09/2018 06:24 AM    BASOPHILS 2.10 (H) 10/09/2018 06:24 AM    HYPOCHROMIA 1+ 02/18/2014 06:04 AM        Lab Results   Component Value Date/Time    HBA1C 5.8 (H) 02/18/2014 06:04 AM            DIAGNOSIS   Visit Diagnoses     ICD-10-CM   1. Type 2 diabetes mellitus without complication, unspecified whether long term insulin use (HCC) E11.9   2. Greater trochanteric bursitis of both hips M70.61    M70.62   3. Bilateral hip joint arthritis M16.0   4. Numbness of right foot R20.0   5. Myalgia M79.10   6. Lumbar spondylosis M47.816   7. Acute right-sided low back pain without sciatica M54.5         ASSESSMENT and PLAN:     Amanda Whitmore 68 y.o. female      Amanda Carr was seen today for follow-up.    Diagnoses and all orders for this visit:    Type 2 diabetes mellitus without complication, unspecified whether long term insulin use (HCC)    Greater trochanteric bursitis of both hips  -     REFERRAL TO PHYSICAL THERAPY Reason for Therapy: Eval/Treat/Report    Bilateral hip joint arthritis  -     REFERRAL TO PHYSICAL THERAPY Reason for  Therapy: Eval/Treat/Report    Numbness of right foot  -     REFERRAL TO PHYSICAL THERAPY Reason for Therapy: Eval/Treat/Report    Myalgia  -     DX-LUMBAR SPINE-2 OR 3 VIEWS; Future  -     REFERRAL TO PHYSICAL THERAPY Reason for Therapy: Eval/Treat/Report    Lumbar spondylosis  -     DX-LUMBAR SPINE-2 OR 3 VIEWS; Future  -     REFERRAL TO PHYSICAL THERAPY Reason for Therapy: Eval/Treat/Report    Acute right-sided low back pain without sciatica  -     DX-LUMBAR SPINE-2 OR 3 VIEWS; Future  -     REFERRAL TO PHYSICAL THERAPY Reason for Therapy: Eval/Treat/Report        The patient's greater trochanter bursitis and hip arthritis were treated with intra-articular injections and greater trochanteric injections bilaterally.  She has no pain in these regions.    Low back with right-sided radiation down the posterior lateral aspect of the leg with associated right foot numbness are suspicious for right lower lumbar radiculopathy.  I referred the patient to physical therapy and ordered an x-ray.  Should the patient fail this then I would consider for an MRI of the lumbar spine.    Thank you for allowing me to participate in the care of this patient. If you have any questions please not hesitate to contact me.          Please note that this dictation was created using voice recognition software. I have made every reasonable attempt to correct obvious errors but there may be errors of grammar and content that I may have overlooked prior to finalization of this note.      Donal Ball MD  Interventional Spine and Sports Physiatry  Physical Medicine and Rehabilitation  [facility]  4/2/2019  4:33 PM

## 2019-04-10 ENCOUNTER — TELEPHONE (OUTPATIENT)
Dept: PHYSICAL MEDICINE AND REHAB | Facility: MEDICAL CENTER | Age: 69
End: 2019-04-10

## 2019-04-10 NOTE — TELEPHONE ENCOUNTER
1. Caller Name:Amanda Whitmore                                         Call Back Number: [phone]      Patient approves a detailed voicemail message: N\A    2. Patient is requesting imaging - DX Lumbar Spine results dated: 4/2/19    3. Confirmed results are in chart. Patient advised they will be contacted once interpreted by provider.

## 2019-04-12 NOTE — TELEPHONE ENCOUNTER
There are no fractures. There is arthritis in the lower lumbar levels. However the xray is reasurring. PT was ordered at the last visit. We will follow up after  PT    Dr Ball

## 2019-09-05 ENCOUNTER — TELEPHONE (OUTPATIENT)
Dept: MEDICAL GROUP | Age: 69
End: 2019-09-05

## 2019-09-05 DIAGNOSIS — R60.9 EDEMA, UNSPECIFIED TYPE: ICD-10-CM

## 2019-09-05 RX ORDER — AMILORIDE HYDROCHLORIDE 5 MG/1
5 TABLET ORAL
Qty: 100 TAB | Refills: 4 | Status: SHIPPED | OUTPATIENT
Start: 2019-09-05 | End: 2020-01-21 | Stop reason: SDUPTHER

## 2019-09-05 NOTE — TELEPHONE ENCOUNTER
"Yes.  You can take amiloride 5 mg once a day as needed for swelling or edema.  It has no sulfa in it.  It is a \"potassium sparing\" diuretic so you should not take any supplemental potassium medication with it.  Prescription sent to pharmacy.  .   "

## 2019-09-05 NOTE — TELEPHONE ENCOUNTER
VOICEMAIL  1. Caller Name: Amanda Whitmore                        Call Back Number: [phone] (home)       2. Message: patient states that she cant take sulfa drugs because she is allergic to it. Furosemide has sulfa that's why she hasn't taken it. She was wondering if there is is another medication that does not have sulfa in it.       Please advise.

## 2019-10-23 ENCOUNTER — HOSPITAL ENCOUNTER (OUTPATIENT)
Dept: LAB | Facility: MEDICAL CENTER | Age: 69
End: 2019-10-23
Attending: INTERNAL MEDICINE
Payer: MEDICARE

## 2019-10-23 DIAGNOSIS — E03.4 HYPOTHYROIDISM DUE TO ACQUIRED ATROPHY OF THYROID: ICD-10-CM

## 2019-10-23 DIAGNOSIS — E55.9 HYPOVITAMINOSIS D: ICD-10-CM

## 2019-10-23 DIAGNOSIS — E78.2 MIXED HYPERLIPIDEMIA: ICD-10-CM

## 2019-10-23 DIAGNOSIS — D32.0 BENIGN NEOPLASM OF CEREBRAL MENINGES (HCC): ICD-10-CM

## 2019-10-23 DIAGNOSIS — R73.01 IMPAIRED FASTING GLUCOSE: ICD-10-CM

## 2019-10-23 LAB
ALBUMIN SERPL BCP-MCNC: 3.9 G/DL (ref 3.2–4.9)
ALBUMIN/GLOB SERPL: 1.6 G/DL
ALP SERPL-CCNC: 126 U/L (ref 30–99)
ALT SERPL-CCNC: 18 U/L (ref 2–50)
ANION GAP SERPL CALC-SCNC: 10 MMOL/L (ref 0–11.9)
AST SERPL-CCNC: 18 U/L (ref 12–45)
BASOPHILS # BLD AUTO: 1.1 % (ref 0–1.8)
BASOPHILS # BLD: 0.05 K/UL (ref 0–0.12)
BILIRUB SERPL-MCNC: 0.8 MG/DL (ref 0.1–1.5)
BUN SERPL-MCNC: 16 MG/DL (ref 8–22)
CALCIUM SERPL-MCNC: 9.3 MG/DL (ref 8.5–10.5)
CHLORIDE SERPL-SCNC: 109 MMOL/L (ref 96–112)
CHOLEST SERPL-MCNC: 198 MG/DL (ref 100–199)
CO2 SERPL-SCNC: 21 MMOL/L (ref 20–33)
CREAT SERPL-MCNC: 0.61 MG/DL (ref 0.5–1.4)
EOSINOPHIL # BLD AUTO: 0.14 K/UL (ref 0–0.51)
EOSINOPHIL NFR BLD: 3.1 % (ref 0–6.9)
ERYTHROCYTE [DISTWIDTH] IN BLOOD BY AUTOMATED COUNT: 42.5 FL (ref 35.9–50)
EST. AVERAGE GLUCOSE BLD GHB EST-MCNC: 120 MG/DL
GLOBULIN SER CALC-MCNC: 2.5 G/DL (ref 1.9–3.5)
GLUCOSE SERPL-MCNC: 101 MG/DL (ref 65–99)
HBA1C MFR BLD: 5.8 % (ref 0–5.6)
HCT VFR BLD AUTO: 44.5 % (ref 37–47)
HDLC SERPL-MCNC: 71 MG/DL
HGB BLD-MCNC: 14.5 G/DL (ref 12–16)
IMM GRANULOCYTES # BLD AUTO: 0.01 K/UL (ref 0–0.11)
IMM GRANULOCYTES NFR BLD AUTO: 0.2 % (ref 0–0.9)
LDLC SERPL CALC-MCNC: 113 MG/DL
LYMPHOCYTES # BLD AUTO: 1.25 K/UL (ref 1–4.8)
LYMPHOCYTES NFR BLD: 28 % (ref 22–41)
MCH RBC QN AUTO: 28.4 PG (ref 27–33)
MCHC RBC AUTO-ENTMCNC: 32.6 G/DL (ref 33.6–35)
MCV RBC AUTO: 87.3 FL (ref 81.4–97.8)
MONOCYTES # BLD AUTO: 0.5 K/UL (ref 0–0.85)
MONOCYTES NFR BLD AUTO: 11.2 % (ref 0–13.4)
NEUTROPHILS # BLD AUTO: 2.52 K/UL (ref 2–7.15)
NEUTROPHILS NFR BLD: 56.4 % (ref 44–72)
NRBC # BLD AUTO: 0 K/UL
NRBC BLD-RTO: 0 /100 WBC
PLATELET # BLD AUTO: 262 K/UL (ref 164–446)
PMV BLD AUTO: 10.7 FL (ref 9–12.9)
POTASSIUM SERPL-SCNC: 3.8 MMOL/L (ref 3.6–5.5)
PROT SERPL-MCNC: 6.4 G/DL (ref 6–8.2)
RBC # BLD AUTO: 5.1 M/UL (ref 4.2–5.4)
SODIUM SERPL-SCNC: 140 MMOL/L (ref 135–145)
TRIGL SERPL-MCNC: 72 MG/DL (ref 0–149)
WBC # BLD AUTO: 4.5 K/UL (ref 4.8–10.8)

## 2019-10-23 PROCEDURE — 84443 ASSAY THYROID STIM HORMONE: CPT

## 2019-10-23 PROCEDURE — 36415 COLL VENOUS BLD VENIPUNCTURE: CPT

## 2019-10-23 PROCEDURE — 84439 ASSAY OF FREE THYROXINE: CPT

## 2019-10-23 PROCEDURE — 80053 COMPREHEN METABOLIC PANEL: CPT

## 2019-10-23 PROCEDURE — 80061 LIPID PANEL: CPT

## 2019-10-23 PROCEDURE — 83036 HEMOGLOBIN GLYCOSYLATED A1C: CPT

## 2019-10-23 PROCEDURE — 82306 VITAMIN D 25 HYDROXY: CPT

## 2019-10-23 PROCEDURE — 84481 FREE ASSAY (FT-3): CPT

## 2019-10-23 PROCEDURE — 85025 COMPLETE CBC W/AUTO DIFF WBC: CPT

## 2019-10-24 LAB
25(OH)D3 SERPL-MCNC: 22 NG/ML (ref 30–100)
T3FREE SERPL-MCNC: 4.72 PG/ML (ref 2.4–4.2)
T4 FREE SERPL-MCNC: 1.04 NG/DL (ref 0.53–1.43)
TSH SERPL DL<=0.005 MIU/L-ACNC: <0.005 UIU/ML (ref 0.38–5.33)

## 2019-10-30 ENCOUNTER — HOSPITAL ENCOUNTER (OUTPATIENT)
Dept: LAB | Facility: MEDICAL CENTER | Age: 69
End: 2019-10-30
Attending: INTERNAL MEDICINE
Payer: MEDICARE

## 2019-10-30 ENCOUNTER — OFFICE VISIT (OUTPATIENT)
Dept: MEDICAL GROUP | Age: 69
End: 2019-10-30
Payer: MEDICARE

## 2019-10-30 VITALS
HEART RATE: 76 BPM | TEMPERATURE: 96.7 F | OXYGEN SATURATION: 93 % | BODY MASS INDEX: 31.38 KG/M2 | DIASTOLIC BLOOD PRESSURE: 82 MMHG | WEIGHT: 177.1 LBS | SYSTOLIC BLOOD PRESSURE: 126 MMHG | HEIGHT: 63 IN

## 2019-10-30 DIAGNOSIS — K21.9 GASTROESOPHAGEAL REFLUX DISEASE, ESOPHAGITIS PRESENCE NOT SPECIFIED: ICD-10-CM

## 2019-10-30 DIAGNOSIS — G47.33 OBSTRUCTIVE SLEEP APNEA SYNDROME: ICD-10-CM

## 2019-10-30 DIAGNOSIS — E55.9 HYPOVITAMINOSIS D: ICD-10-CM

## 2019-10-30 DIAGNOSIS — M16.0 PRIMARY OSTEOARTHRITIS OF BOTH HIPS: ICD-10-CM

## 2019-10-30 DIAGNOSIS — M54.6 CHRONIC MIDLINE THORACIC BACK PAIN: ICD-10-CM

## 2019-10-30 DIAGNOSIS — D33.3 BENIGN NEOPLASM OF CRANIAL NERVES (HCC): ICD-10-CM

## 2019-10-30 DIAGNOSIS — F51.01 PRIMARY INSOMNIA: ICD-10-CM

## 2019-10-30 DIAGNOSIS — N39.0 RECURRENT UTI: ICD-10-CM

## 2019-10-30 DIAGNOSIS — R73.01 IMPAIRED FASTING GLUCOSE: ICD-10-CM

## 2019-10-30 DIAGNOSIS — M79.89 LEG SWELLING: ICD-10-CM

## 2019-10-30 DIAGNOSIS — G89.29 CHRONIC MIDLINE THORACIC BACK PAIN: ICD-10-CM

## 2019-10-30 DIAGNOSIS — E03.4 HYPOTHYROIDISM DUE TO ACQUIRED ATROPHY OF THYROID: ICD-10-CM

## 2019-10-30 DIAGNOSIS — D32.9 MENINGIOMA OF RIGHT SPHENOID WING INVOLVING CAVERNOUS SINUS (HCC): ICD-10-CM

## 2019-10-30 DIAGNOSIS — E78.2 MIXED HYPERLIPIDEMIA: ICD-10-CM

## 2019-10-30 DIAGNOSIS — H53.2 DIPLOPIA: ICD-10-CM

## 2019-10-30 DIAGNOSIS — E66.9 OBESITY (BMI 30.0-34.9): ICD-10-CM

## 2019-10-30 DIAGNOSIS — I10 ESSENTIAL HYPERTENSION: ICD-10-CM

## 2019-10-30 DIAGNOSIS — R20.0 NUMBNESS IN RIGHT LEG: ICD-10-CM

## 2019-10-30 LAB
FOLATE SERPL-MCNC: 21.2 NG/ML
VIT B12 SERPL-MCNC: 705 PG/ML (ref 211–911)

## 2019-10-30 PROCEDURE — 83921 ORGANIC ACID SINGLE QUANT: CPT

## 2019-10-30 PROCEDURE — 99215 OFFICE O/P EST HI 40 MIN: CPT | Performed by: INTERNAL MEDICINE

## 2019-10-30 PROCEDURE — 82746 ASSAY OF FOLIC ACID SERUM: CPT

## 2019-10-30 PROCEDURE — 8041 PR SCP AHA: Performed by: INTERNAL MEDICINE

## 2019-10-30 PROCEDURE — 82607 VITAMIN B-12: CPT

## 2019-10-30 PROCEDURE — 36415 COLL VENOUS BLD VENIPUNCTURE: CPT

## 2019-10-30 RX ORDER — TRAZODONE HYDROCHLORIDE 150 MG/1
150 TABLET ORAL
Qty: 100 TAB | Refills: 4 | Status: SHIPPED | OUTPATIENT
Start: 2019-10-30 | End: 2021-04-20 | Stop reason: SDUPTHER

## 2019-10-30 ASSESSMENT — ENCOUNTER SYMPTOMS
DIZZINESS: 0
SPEECH CHANGE: 0
CONSTITUTIONAL NEGATIVE: 1
GASTROINTESTINAL NEGATIVE: 1
RESPIRATORY NEGATIVE: 1
EYES NEGATIVE: 1
FOCAL WEAKNESS: 0
TINGLING: 1
SENSORY CHANGE: 1
CARDIOVASCULAR NEGATIVE: 1
HEADACHES: 0

## 2019-10-30 NOTE — PROGRESS NOTES
Subjective:      Amanda Whitmore is a 68 y.o. female who presents with Hyperlipidemia (lab review)          HPI  The patient is here for followup of chronic medical problems listed below. The patient is compliant with medications and having no side effects from them. Denies chest pain, abdominal pain, dyspnea, myalgias, or cough.    Patient experienced a mechanical ground level fall in 2016. Patient complains of mild numbness and tingling originating in right foot radiating to the right knee. Patient has not treated her symptoms with any medications and denies any alleviating factors. Patient denies any recent falls or trauma to right leg.   Patient denies any other neurological changes. She denies any headache, dizziness, vision changes, or speech changes.       Annual Health Assessment Questions  1.  Are you currently engaging in any exercise or physical activity? No  2.  How would you describe your mood or emotional well-being today? good  3.  Have you had any falls in the last year? No  4.  Have you noticed any problems with your balance or had difficulty walking? Yes  5.  In the last six months have you experienced any leakage of urine? No  6. DPA/Advanced Directive: Patient has Living Will, but it is not on file. Instructed to bring in a copy to scan into their chart.      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 100 Tab 4   • acetaminophen (TYLENOL) 500 MG Tab Take 500-1,000 mg by mouth every 6 hours as needed.     • atenolol (TENORMIN) 50 MG Tab Take 1 Tab by mouth every day. 90 Tab 4   • thyroid (ARMOUR THYROID) 120 MG Tab TAKE 1/2 TABLET ORALLY TWICE A DAY 90 Tab 4   • thyroid (ARMOUR THYROID) 90 MG Tab TAKE 1 TABLET BY MOUTH ONCE DAILY 90 Tab 4   • traZODone (DESYREL) 150 MG Tab Take 1 Tab by mouth every bedtime. 90 Tab 4   • liothyronine (CYTOMEL) 25 MCG Tab Take 1 Tab by mouth every day. 90 Tab 4   • Glucos-Chond-Hyal Ac-Ca Fructo (MOVE FREE [facility] ADVANCE) Tab Take  by mouth every day.     • nitrofurantoin (MACRODANTIN) 50 MG Cap Take 1 Cap by mouth 4 times a day. (Patient not taking: Reported on 4/2/2019) 360 Cap 4   • albuterol 108 (90 Base) MCG/ACT Aero Soln inhalation aerosol Inhale 2 Puffs by mouth every 6 hours as needed. (Patient not taking: Reported on 10/30/2019) 8.5 g 3     No facility-administered medications prior to visit.         Allergies   Allergen Reactions   • Alcohol Anaphylaxis     ETOH   • Asa [Aspirin] Anaphylaxis   • Bee Anaphylaxis   • Iodine Hives and Shortness of Breath     Contrast; \"will black out\"   • Shellfish Allergy Anaphylaxis   • Sulfa Drugs Anaphylaxis     \"will kill me\"   • Tetanus Antitoxin Nausea and Swelling     \"Swelling and violently ill\"; increased temp!!!!!!   • Tape Contact Dermatitis     Paper tape ok; will get welts and peeling skin with regular tape   • Influenza Vaccines Unspecified     Local reaction with deltoid pain swelling and swelling   • Other Environmental      Pt states that she is " "allergic \"to the sun\"   • Synthroid [Levothroid]      Nausea fatigue malaise       Review of Systems   Constitutional: Negative.    HENT: Negative.    Eyes: Negative.    Respiratory: Negative.    Cardiovascular: Negative.    Gastrointestinal: Negative.    Genitourinary: Negative.    Neurological: Positive for tingling (Right foot and right knee ) and sensory change. Negative for dizziness, speech change, focal weakness and headaches.   All other systems reviewed and are negative.       Objective:     /82 (BP Location: Left arm, Patient Position: Sitting, BP Cuff Size: Adult)   Pulse 76   Temp 35.9 °C (96.7 °F) (Temporal)   Ht 1.6 m (5' 3\")   Wt 80.3 kg (177 lb 1.6 oz) Comment: Pt stated  LMP  (LMP Unknown)   SpO2 93%   Breastfeeding? No   BMI 31.37 kg/m²     Physical Exam   Constitutional: Oriented to person, place, and time. Appears well-developed and well-nourished. No distress.   Head: Normocephalic and atraumatic.   Right Ear: External ear normal.   Left Ear: External ear normal.   Nose: Nose normal.   Mouth/Throat: Oropharynx is clear and moist. No oropharyngeal exudate.   Eyes: Pupils are equal, round, and reactive to light. Conjunctivae and EOM are normal. Right eye exhibits no discharge. Left eye exhibits no discharge. No scleral icterus.   Neck: Normal range of motion. Neck supple. No JVD present. No tracheal deviation present. No thyromegaly present.   Cardiovascular: Normal rate, regular rhythm, normal heart sounds and intact distal pulses.  Exam reveals no gallop and no friction rub.    No murmur heard.  Pulmonary/Chest: Effort normal. No stridor. No respiratory distress. No wheezing or rales. No tenderness.   Abdominal: Soft. Bowel sounds are normal. No distension and no mass. There is no tenderness. There is no rebound and no guarding. No hernia.   Musculoskeletal: Normal range of motion No edema or tenderness.   Lymphadenopathy: No cervical adenopathy.   Neurological: Alert and oriented " to person, place, and time. Normal reflexes. Normal reflexes. No cranial nerve deficit. Normal muscle tone. Coordination normal.   Skin: Skin is warm and dry. No rash noted. Not diaphoretic. No erythema. No pallor.   Psychiatric: Normal mood and affect. Behavior is normal. Judgment and thought content normal.   Nursing note and vitals reviewed.      Lab Results   Component Value Date/Time    HBA1C 5.8 (H) 10/23/2019 06:08 AM    HBA1C 5.8 (H) 02/18/2014 06:04 AM     Lab Results   Component Value Date/Time    SODIUM 140 10/23/2019 06:08 AM    POTASSIUM 3.8 10/23/2019 06:08 AM    CHLORIDE 109 10/23/2019 06:08 AM    CO2 21 10/23/2019 06:08 AM    GLUCOSE 101 (H) 10/23/2019 06:08 AM    BUN 16 10/23/2019 06:08 AM    CREATININE 0.61 10/23/2019 06:08 AM    CREATININE 0.63 01/31/2013 08:06 AM    BUNCREATRAT 29 (H) 08/08/2013 08:11 AM    BUNCREATRAT 29 (H) 01/31/2013 08:06 AM    ALKPHOSPHAT 126 (H) 10/23/2019 06:08 AM    ASTSGOT 18 10/23/2019 06:08 AM    ALTSGPT 18 10/23/2019 06:08 AM    TBILIRUBIN 0.8 10/23/2019 06:08 AM     No results found for: INR  Lab Results   Component Value Date/Time    CHOLSTRLTOT 198 10/23/2019 06:08 AM     (H) 10/23/2019 06:08 AM    HDL 71 10/23/2019 06:08 AM    TRIGLYCERIDE 72 10/23/2019 06:08 AM       No results found for: TESTOSTERONE  Lab Results   Component Value Date/Time    TSH <0.006 (L) 08/08/2013 08:11 AM    TSH <0.006 (L) 01/31/2013 08:06 AM     Lab Results   Component Value Date/Time    FREET4 1.04 10/23/2019 06:08 AM    FREET4 1.52 (H) 09/20/2017 06:06 AM     No results found for: URICACID  No components found for: VITB12  Lab Results   Component Value Date/Time    25HYDROXY 22 (L) 10/23/2019 06:08 AM    25HYDROXY 19 (L) 09/27/2016 07:32 AM        Assessment/Plan:     1. 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 Under good control. Continue with current regimen.     2. Diplopia- dr gould and dr hernandez  - Stable. Under  good control. Continue same regimen.     3. Essential hypertension  - Chronic, stable. Under good control with Midamor 5 mg daily, Atenolol 50 mg daily. Continue with current regimen.    - Discussed to eat low-sodium diet and encouraged to do regular physical exercise.    4. Hypothyroidism due to acquired atrophy of thyroid  - Chronic, stable. TSH was <0.005, Free T4 was 1.04, Free T3 was 4.72. Under good control with [place] Thyroid 120 mg daily, Cytomel 25 mcg daily. Continue with current regimen.    5. Hypovitaminosis D  - Vitamin D still low at 22. Patient is taking Vitamin D supplement.   Continue same regimen.    6. Impaired fasting glucose  - Stable. Hemoglobin A1c improved from 6.0 to 5.8 on 10/23/19. Under good control. Continue same regimen.   - diet/exercise/lose 15 lbs.; patient counseled.     7. Leg swelling-right, chronic- SUPPORT HOSE  - Under good control. Continue same regimen.     8. 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 Continue same regimen.     9. Mixed hyperlipidemia  - Chronic, stable. Total cholesterol stable at 198. LDL slightly worsened from 108 to 113. HDL and Triglycerides within normal range.    Under good control with diet and exercise. Continue with current regimen.    - Advised to eat low fat, low carbohydrate and high fiber diet as well as do cardio physical exercise regularly.      10. Obesity (BMI 30.0-34.9)   diet/exercise/lose 15 lbs.; patient counseled     11. Obstructive sleep apnea syndrome  Under good control. Continue same regimen.     12. Primary osteoarthritis of both hips- dr browne; also rx's  [place]  - Chronic, stable. Under good control. Continue same regimen.     13. Recurrent UTI  - Stable. Patient asymptomatic.     14. Gastroesophageal reflux disease, esophagitis presence not specified  Under good control. Continue same regimen.     15. Chronic midline thoracic back pain  -  Chronic, stable. Under good control. Continue same regimen.     16. Primary insomnia  - Chronic, stable. Under good control with Trazodone 150 mg every evening. Continue with current regimen.    - traZODone (DESYREL) 150 MG Tab; Take 1 Tab by mouth every bedtime.  Dispense: 100 Tab; Refill: 4    17. Numbness in right leg  - New problem. Patient complains of tingling and numbness from right foot into right knee. No recent history of trauma or injury to right leg.   Patient was referred to Neurology for evaluation. B12 level ordered to evaluate for deficiency.   - REFERRAL TO NEUROLOGY  - VITAMIN B12; Future  - FOLATE; Future  - METHYLMALONIC ACID, SERUM; Future         40 minute face-to-face encounter took place today.  More than half of this time was spent in the coordination of care of the above problems, as well as counseling.     Stoney GONZALEZ (Zaine), am scribing for, and in the presence of, Cricket Salguero M.D..    Electronically signed by: Stoney March (Meghana), 10/30/2019    Cricket GONZALEZ M.D., personally performed the services described in this documentation, as scribed by Stoney Macrh in my presence, and it is both accurate and complete.

## 2019-10-30 NOTE — PROGRESS NOTES
Annual Health Assessment Questions:    1.  Are you currently engaging in any exercise or physical activity? No    2.  How would you describe your mood or emotional well-being today? good    3.  Have you had any falls in the last year? No    4.  Have you noticed any problems with your balance or had difficulty walking? Yes    5.  In the last six months have you experienced any leakage of urine? No    6. DPA/Advanced Directive: Patient has Living Will, but it is not on file. Instructed to bring in a copy to scan into their chart.

## 2019-11-02 LAB — METHYLMALONATE SERPL-SCNC: 0.13 UMOL/L (ref 0–0.4)

## 2019-11-14 ENCOUNTER — PATIENT OUTREACH (OUTPATIENT)
Dept: HEALTH INFORMATION MANAGEMENT | Facility: OTHER | Age: 69
End: 2019-11-14

## 2019-11-14 NOTE — PROGRESS NOTES
Outcome: Left voicemail/ message Left Message - AHA list- needs [facility] PA intro    Please transfer to [facility]  [phone] when patient returns call.     [facility] Verified: yes     Attempt # 1

## 2020-01-21 DIAGNOSIS — I10 ESSENTIAL HYPERTENSION: ICD-10-CM

## 2020-01-21 DIAGNOSIS — N39.0 RECURRENT UTI: ICD-10-CM

## 2020-01-21 DIAGNOSIS — R60.9 EDEMA, UNSPECIFIED TYPE: ICD-10-CM

## 2020-01-21 DIAGNOSIS — E03.4 HYPOTHYROIDISM DUE TO ACQUIRED ATROPHY OF THYROID: ICD-10-CM

## 2020-01-21 RX ORDER — THYROID 90 MG/1
TABLET ORAL
Qty: 90 TAB | Refills: 4 | Status: SHIPPED | OUTPATIENT
Start: 2020-01-21 | End: 2021-04-20 | Stop reason: SDUPTHER

## 2020-01-21 RX ORDER — ATENOLOL 50 MG/1
50 TABLET ORAL DAILY
Qty: 90 TAB | Refills: 4 | Status: SHIPPED | OUTPATIENT
Start: 2020-01-21 | End: 2021-04-20 | Stop reason: SDUPTHER

## 2020-01-21 RX ORDER — THYROID 120 MG/1
TABLET ORAL
Qty: 90 TAB | Refills: 4 | Status: SHIPPED | OUTPATIENT
Start: 2020-01-21 | End: 2021-04-20 | Stop reason: SDUPTHER

## 2020-01-21 RX ORDER — AMILORIDE HYDROCHLORIDE 5 MG/1
5 TABLET ORAL
Qty: 90 TAB | Refills: 4 | Status: SHIPPED | OUTPATIENT
Start: 2020-01-21 | End: 2021-04-23

## 2020-01-21 RX ORDER — LIOTHYRONINE SODIUM 25 UG/1
25 TABLET ORAL DAILY
Qty: 90 TAB | Refills: 4 | Status: SHIPPED | OUTPATIENT
Start: 2020-01-21 | End: 2021-04-20 | Stop reason: SDUPTHER

## 2020-01-21 NOTE — TELEPHONE ENCOUNTER
Was the patient seen in the last year in this department? Yes    Does patient have an active prescription for medications requested? No     Received Request Via: Patient         Patient states at last appointment no meds were refilled because she had multiple refills on all of them, she states when she spoke to her pharmacy they were  and they all required new scripts.

## 2020-01-22 RX ORDER — NITROFURANTOIN MACROCRYSTALS 50 MG/1
CAPSULE ORAL
Qty: 360 CAP | Refills: 4 | Status: SHIPPED | OUTPATIENT
Start: 2020-01-22

## 2020-01-22 NOTE — TELEPHONE ENCOUNTER
Was the patient seen in the last year in this department? Yes    Does patient have an active prescription for medications requested? No medication was D/C on 10/2018    Received Request Via: Patient

## 2020-05-11 NOTE — PROGRESS NOTES
Outcome: Left voicemail/ message    Please transfer to [facility]  [phone] when patient returns call.     [facility] Verified: yes     Attempt # 2

## 2020-09-16 ENCOUNTER — TELEPHONE (OUTPATIENT)
Dept: MEDICAL GROUP | Age: 70
End: 2020-09-16

## 2020-09-16 NOTE — TELEPHONE ENCOUNTER
Phone Number Called: Amandabenita Nixth Haim      Call outcome: Spoke to patient regarding message below.    Message: Pt states that she is doing well and that she would like to schedule an appointment with us when her medications are due for refills which I informed her would be in January of next year. Offered to schedule her an appointment but she declined and stated that she will call us when it gets closer.

## 2020-10-14 ENCOUNTER — PATIENT OUTREACH (OUTPATIENT)
Dept: HEALTH INFORMATION MANAGEMENT | Facility: OTHER | Age: 70
End: 2020-10-14

## 2020-10-14 NOTE — PROGRESS NOTES
Outcome: Declined AHA    Please transfer to [facility]  [phone] when patient returns call.     [facility] Verified: yes     Attempt # 1

## 2020-10-19 ENCOUNTER — TELEPHONE (OUTPATIENT)
Dept: RADIATION ONCOLOGY | Facility: MEDICAL CENTER | Age: 70
End: 2020-10-19

## 2020-10-19 DIAGNOSIS — D32.0 BENIGN NEOPLASM OF CEREBRAL MENINGES (HCC): ICD-10-CM

## 2020-11-03 ENCOUNTER — HOSPITAL ENCOUNTER (OUTPATIENT)
Dept: LAB | Facility: MEDICAL CENTER | Age: 70
End: 2020-11-03
Attending: RADIOLOGY
Payer: MEDICARE

## 2020-11-03 DIAGNOSIS — D32.0 BENIGN NEOPLASM OF CEREBRAL MENINGES (HCC): ICD-10-CM

## 2020-11-03 LAB
ANION GAP SERPL CALC-SCNC: 5 MMOL/L (ref 7–16)
BUN SERPL-MCNC: 12 MG/DL (ref 8–22)
CALCIUM SERPL-MCNC: 9.2 MG/DL (ref 8.5–10.5)
CHLORIDE SERPL-SCNC: 105 MMOL/L (ref 96–112)
CO2 SERPL-SCNC: 22 MMOL/L (ref 20–33)
CREAT SERPL-MCNC: 0.47 MG/DL (ref 0.5–1.4)
FASTING STATUS PATIENT QL REPORTED: NORMAL
GLUCOSE SERPL-MCNC: 105 MG/DL (ref 65–99)
POTASSIUM SERPL-SCNC: 3.9 MMOL/L (ref 3.6–5.5)
SODIUM SERPL-SCNC: 132 MMOL/L (ref 135–145)

## 2020-11-03 PROCEDURE — 80048 BASIC METABOLIC PNL TOTAL CA: CPT

## 2020-11-03 PROCEDURE — 36415 COLL VENOUS BLD VENIPUNCTURE: CPT

## 2020-11-10 ENCOUNTER — HOSPITAL ENCOUNTER (OUTPATIENT)
Dept: RADIOLOGY | Facility: MEDICAL CENTER | Age: 70
End: 2020-11-10
Attending: RADIOLOGY
Payer: MEDICARE

## 2020-11-10 DIAGNOSIS — D32.0 BENIGN NEOPLASM OF CEREBRAL MENINGES (HCC): ICD-10-CM

## 2020-11-10 PROCEDURE — 70553 MRI BRAIN STEM W/O & W/DYE: CPT

## 2020-11-10 PROCEDURE — 700117 HCHG RX CONTRAST REV CODE 255: Performed by: RADIOLOGY

## 2020-11-10 PROCEDURE — A9576 INJ PROHANCE MULTIPACK: HCPCS | Performed by: RADIOLOGY

## 2020-11-10 RX ADMIN — GADOTERIDOL 15 ML: 279.3 INJECTION, SOLUTION INTRAVENOUS at 09:54

## 2020-11-16 ENCOUNTER — HOSPITAL ENCOUNTER (OUTPATIENT)
Dept: RADIATION ONCOLOGY | Facility: MEDICAL CENTER | Age: 70
End: 2020-11-30
Attending: RADIOLOGY
Payer: MEDICARE

## 2020-11-16 VITALS
SYSTOLIC BLOOD PRESSURE: 148 MMHG | OXYGEN SATURATION: 93 % | TEMPERATURE: 97.9 F | DIASTOLIC BLOOD PRESSURE: 70 MMHG | BODY MASS INDEX: 32.77 KG/M2 | HEART RATE: 83 BPM | WEIGHT: 185 LBS

## 2020-11-16 PROCEDURE — 99212 OFFICE O/P EST SF 10 MIN: CPT | Performed by: RADIOLOGY

## 2020-11-16 PROCEDURE — 99214 OFFICE O/P EST MOD 30 MIN: CPT | Performed by: RADIOLOGY

## 2020-11-16 ASSESSMENT — FIBROSIS 4 INDEX: FIB4 SCORE: 1.15

## 2020-11-16 ASSESSMENT — PAIN SCALES - GENERAL: PAINLEVEL: NO PAIN

## 2020-11-16 NOTE — PROGRESS NOTES
RADIATION ONCOLOGY FOLLOW UP    DATE OF SERVICE: 11/16/2020    IDENTIFICATION:   Right-sided cavernous sinus meningioma, treated with stereotactic   radiosurgery to 2100 cGy in 3 fractions completing in March 2015.     INTERVAL HISTORY: I had the pleasure of seeing Ms. Whitmore today in follow up for her meningioma.  Patient states she has been stable from a symptom standpoint.  She gets intermittent low-grade headaches on a monthly basis.  These respond well to Tylenol.  Her most recent brain MRI shows even slight regression of her meningioma which has been stable over the last 5 years.  There are not any areas of concern.  Patient is otherwise been in her usual health    PHYSICAL EXAM:    Vitals:    11/16/20 0948   BP: 148/70   BP Location: Right arm   Patient Position: Sitting   BP Cuff Size: Adult   Pulse: 83   Temp: 36.6 °C (97.9 °F)   TempSrc: Temporal   SpO2: 93%   Weight: 83.9 kg (185 lb)   Pain Score: No pain      0= Fully active, able to carry on all pre-disease performance without restriction.    GENERAL: No apparent distress.  HEENT:  Pupils are equal, round, and reactive to light.  Extraocular muscles   are intact. Sclerae nonicteric.  Conjunctivae pink.  Oral cavity, tongue   protrudes midline.   NECK:  Supple without evidence of thyromegaly.  NODES:  No peripheral adenopathy of the neck, supraclavicular fossa or axillae   bilaterally.  LUNGS:  Clear to ascultation bilaterally   HEART:  Regular rate and rhythm.  No murmur appreciated  ABDOMEN:  Soft. No evidence of hepatosplenomegaly.  Positive bowel sounds.  EXTREMITIES:  Without Edema.  NEUROLOGIC:  Cranial nerves II through XII were intact. Normal stance and gait motor and sensory grossly within normal limits        IMPRESSION:   Right-sided cavernous sinus meningioma, treated with stereotactic   radiosurgery to 2100 cGy in 3 fractions completing in March 2015.     RECOMMENDATIONS:   I discussed with the patient her findings over a 25 minute time  period, 95% of that time dedicated to an ongoing survivorship plan.  Patient is now 5 years out from treatment.  We spent some time discussing the interval of follow-up.  We went over a 2-year, 5-year, or as needed by symptom imaging protocol.  Given the eloquent location of her meningioma and the consequence of recurrence we have decided upon a 2-year interval.  Patient does state that the open MRI she utilize this year was significantly less stressful than the traditional MRI tube.        If patient has any questions or concerns, she should feel free to contact me.

## 2020-11-16 NOTE — NON-PROVIDER
Patient was seen today in clinic with Dr. Olguin for follow up.  Vitals signs and weight were obtained and pain assessment was completed.  Allergies and medications were reviewed with the patient.  Toxicities of treatment assessed.     Vitals/Pain:  Vitals:    11/16/20 0948   BP: 148/70   BP Location: Right arm   Patient Position: Sitting   BP Cuff Size: Adult   Pulse: 83   Temp: 36.6 °C (97.9 °F)   TempSrc: Temporal   SpO2: 93%   Weight: 83.9 kg (185 lb)   Pain Score: No pain        Allergies:   Alcohol, Asa [aspirin], Bee, Iodine, Shellfish allergy, Sulfa drugs, Tetanus antitoxin, Tape, Influenza vaccines, Other environmental, and Synthroid [levothroid]    Current Medications:  Current Outpatient Medications   Medication Sig Dispense Refill   • nitrofurantoin (MACRODANTIN) 50 MG Cap TAKE ONE CAPSULE BY MOUTH FOUR TIMES A  Cap 4   • atenolol (TENORMIN) 50 MG Tab Take 1 Tab by mouth every day. 90 Tab 4   • thyroid (ARMOUR THYROID) 90 MG Tab TAKE 1 TABLET BY MOUTH ONCE DAILY 90 Tab 4   • thyroid (ARMOUR THYROID) 120 MG Tab TAKE 1/2 TABLET ORALLY TWICE A DAY 90 Tab 4   • liothyronine (CYTOMEL) 25 MCG Tab Take 1 Tab by mouth every day. 90 Tab 4   • AMILoride (MIDAMOR) 5 MG Tab Take 1 Tab by mouth 1 time daily as needed (Swelling, ankle or leg swelling/edema). 90 Tab 4   • traZODone (DESYREL) 150 MG Tab Take 1 Tab by mouth every bedtime. 100 Tab 4   • acetaminophen (TYLENOL) 500 MG Tab Take 500-1,000 mg by mouth every 6 hours as needed.     • Glucos-Chond-Hyal Ac-Ca Fructo (MOVE FREE [facility] ADVANCE) Tab Take  by mouth every day.     • albuterol 108 (90 Base) MCG/ACT Aero Soln inhalation aerosol Inhale 2 Puffs by mouth every 6 hours as needed. (Patient not taking: Reported on 10/30/2019) 8.5 g 3     No current facility-administered medications for this encounter.          PCP:  Noemy Hurtado, Med Ass't

## 2021-01-15 DIAGNOSIS — Z23 NEED FOR VACCINATION: ICD-10-CM

## 2021-03-23 ENCOUNTER — PATIENT OUTREACH (OUTPATIENT)
Dept: HEALTH INFORMATION MANAGEMENT | Facility: OTHER | Age: 71
End: 2021-03-23

## 2021-03-23 NOTE — PROGRESS NOTES
Outcome: Left Message to call back to schedule overdue mammogram    Please transfer to Patient Outreach Team at [phone] when patient returns call.    Attempt # 1

## 2021-04-19 ENCOUNTER — TELEPHONE (OUTPATIENT)
Dept: MEDICAL GROUP | Age: 71
End: 2021-04-19

## 2021-04-19 DIAGNOSIS — F51.01 PRIMARY INSOMNIA: ICD-10-CM

## 2021-04-19 DIAGNOSIS — E03.4 HYPOTHYROIDISM DUE TO ACQUIRED ATROPHY OF THYROID: ICD-10-CM

## 2021-04-19 DIAGNOSIS — I10 ESSENTIAL HYPERTENSION: ICD-10-CM

## 2021-04-19 NOTE — TELEPHONE ENCOUNTER
VOICEMAIL  1. Caller Name: Amanda Whitmore                        Call Back Number: [phone] (home)       2. Message: Pt did not leave a detail VM just stated that it was regarding her prescription    3. Patient approves office to leave a detailed voicemail/MyChart message: N\A      Phone Number Called: [phone] (home)       Call outcome: Did not leave a detailed message. Requested patient to call back.    Message: 1st attempt

## 2021-04-20 NOTE — TELEPHONE ENCOUNTER
Received request via: Patient    Was the patient seen in the last year in this department? No     Does the patient have an active prescription (recently filled or refills available) for medication(s) requested? No     Patient has an appointment with you next Monday.

## 2021-04-21 RX ORDER — THYROID 120 MG/1
TABLET ORAL
Qty: 100 TABLET | Refills: 4 | Status: SHIPPED | OUTPATIENT
Start: 2021-04-21 | End: 2022-04-04

## 2021-04-21 RX ORDER — TRAZODONE HYDROCHLORIDE 150 MG/1
150 TABLET ORAL
Qty: 100 TABLET | Refills: 4 | Status: SHIPPED | OUTPATIENT
Start: 2021-04-21 | End: 2022-08-19 | Stop reason: SDUPTHER

## 2021-04-21 RX ORDER — ATENOLOL 50 MG/1
50 TABLET ORAL DAILY
Qty: 100 TABLET | Refills: 4 | Status: SHIPPED | OUTPATIENT
Start: 2021-04-21 | End: 2022-05-09

## 2021-04-21 RX ORDER — THYROID 90 MG/1
TABLET ORAL
Qty: 100 TABLET | Refills: 4 | Status: SHIPPED | OUTPATIENT
Start: 2021-04-21 | End: 2022-04-04

## 2021-04-21 RX ORDER — LIOTHYRONINE SODIUM 25 UG/1
25 TABLET ORAL DAILY
Qty: 100 TABLET | Refills: 4 | Status: SHIPPED | OUTPATIENT
Start: 2021-04-21 | End: 2022-05-09

## 2021-04-23 ENCOUNTER — TELEPHONE (OUTPATIENT)
Dept: MEDICAL GROUP | Age: 71
End: 2021-04-23

## 2021-04-23 DIAGNOSIS — R60.9 EDEMA, UNSPECIFIED TYPE: Primary | ICD-10-CM

## 2021-04-23 NOTE — TELEPHONE ENCOUNTER
ESTABLISHED PATIENT PRE-VISIT PLANNING   Friday, 04/23/21@3:30PM:    Patient was NOT contacted to complete PVP.     Note: Patient will not be contacted if there is no indication to call.     1.  Reviewed notes from the last few office visits within the medical group: Yes    2.  If any orders were placed at last visit or intended to be done for this visit (i.e. 6 mos follow-up), do we have Results/Consult Notes?         •  Labs - Labs not ordered by  and completed since 10/30/2019.    Note: If patient appointment is for lab review and patient did not complete labs, check with provider if OK to reschedule patient until labs completed.       •  Imaging - Imaging ordered, completed and results are in chart.       •  Referrals - No referrals ordered since year 2019.    3. Is this appointment scheduled as a Hospital Follow-Up? No    4.  Immunizations were updated in Epic using [employer] Outside Information activity? No, request failed with [employer]: Registry unable to satisfactorily match patient for request.    5.  Patient is due for the following Health Maintenance Topics:   Health Maintenance Due   Topic Date Due   • HEPATITIS C SCREENING  Never done   • URINE ACR / MICROALBUMIN  Never done   • DIABETES MONOFILAMENT / LE EXAM  Never done   • COVID-19 Vaccine (1) Never done   • IMM HEP B VACCINE (1 of 3 - Risk 3-dose series) Never done   • IMM DTaP/Tdap/Td Vaccine (1 - Tdap) Never done   • IMM ZOSTER VACCINES (1 of 2) Never done   • IMM PNEUMOCOCCAL VACCINE: 65+ Years (1 of 1 - PPSV23) Never done   • COLON CANCER SCREENING ANNUAL FIT  10/09/2019   • Annual Wellness Visit  10/31/2019   • BONE DENSITY  04/13/2020   • A1C SCREENING  04/23/2020   • FASTING LIPID PROFILE  10/23/2020   • RETINAL SCREENING  11/05/2020     6.  AHA (Pulse8) form printed for Provider? Yes

## 2021-04-26 ENCOUNTER — TELEMEDICINE (OUTPATIENT)
Dept: MEDICAL GROUP | Age: 71
End: 2021-04-26
Payer: MEDICARE

## 2021-04-26 VITALS
TEMPERATURE: 97.5 F | HEIGHT: 63 IN | DIASTOLIC BLOOD PRESSURE: 63 MMHG | WEIGHT: 148 LBS | SYSTOLIC BLOOD PRESSURE: 160 MMHG | BODY MASS INDEX: 26.22 KG/M2 | HEART RATE: 80 BPM

## 2021-04-26 DIAGNOSIS — E78.2 MIXED HYPERLIPIDEMIA: ICD-10-CM

## 2021-04-26 DIAGNOSIS — R73.01 IMPAIRED FASTING GLUCOSE: ICD-10-CM

## 2021-04-26 DIAGNOSIS — D32.9 MENINGIOMA OF RIGHT SPHENOID WING INVOLVING CAVERNOUS SINUS (HCC): ICD-10-CM

## 2021-04-26 DIAGNOSIS — H49.21 SIXTH NERVE PALSY OF RIGHT EYE: ICD-10-CM

## 2021-04-26 DIAGNOSIS — E03.4 HYPOTHYROIDISM DUE TO ACQUIRED ATROPHY OF THYROID: ICD-10-CM

## 2021-04-26 DIAGNOSIS — E55.9 HYPOVITAMINOSIS D: ICD-10-CM

## 2021-04-26 DIAGNOSIS — I10 ESSENTIAL HYPERTENSION: ICD-10-CM

## 2021-04-26 PROCEDURE — 99214 OFFICE O/P EST MOD 30 MIN: CPT | Mod: 95,CR | Performed by: INTERNAL MEDICINE

## 2021-04-26 RX ORDER — AMILORIDE HYDROCHLORIDE 5 MG/1
TABLET ORAL
Qty: 100 TABLET | Refills: 4 | Status: SHIPPED | OUTPATIENT
Start: 2021-04-26 | End: 2022-05-09

## 2021-04-26 ASSESSMENT — FIBROSIS 4 INDEX: FIB4 SCORE: 1.17

## 2021-04-26 ASSESSMENT — PATIENT HEALTH QUESTIONNAIRE - PHQ9: CLINICAL INTERPRETATION OF PHQ2 SCORE: 0

## 2021-04-27 ENCOUNTER — PATIENT OUTREACH (OUTPATIENT)
Dept: HEALTH INFORMATION MANAGEMENT | Facility: OTHER | Age: 71
End: 2021-04-27

## 2021-04-27 NOTE — PROGRESS NOTES
"Virtual Visit: Established Patient   This visit was conducted via Zoom using secure and encrypted videoconferencing technology. The patient was in a private location in the state of [place].    The patient's identity was confirmed and verbal consent was obtained for this virtual visit.    Subjective:   CC:   Chief Complaint   Patient presents with   • Medication Refill     AMILoride (MIDAMOR) 5 MG Tab   • Other     questions COVID vaccine        Amanda Whitmore is a 70 y.o. female presenting for evaluation and management of:         ROS    Denies any recent fevers or chills. No nausea or vomiting. No chest pains or shortness of breath.     Allergies   Allergen Reactions   • Alcohol Anaphylaxis     ETOH   • Asa [Aspirin] Anaphylaxis   • Bee Anaphylaxis   • Iodine Hives and Shortness of Breath     Contrast; \"will black out\"   • Shellfish Allergy Anaphylaxis   • Sulfa Drugs Anaphylaxis     \"will kill me\"   • Tetanus Antitoxin Nausea and Swelling     \"Swelling and violently ill\"; increased temp!!!!!!   • Tape Contact Dermatitis     Paper tape ok; will get welts and peeling skin with regular tape   • Influenza Vaccines Unspecified     Local reaction with deltoid pain swelling and swelling   • Other Environmental      Pt states that she is allergic \"to the sun\"   • Synthroid [Levothroid]      Nausea fatigue malaise       Current medicines (including changes today)  Current Outpatient Medications   Medication Sig Dispense Refill   • AMILoride (MIDAMOR) 5 MG Tab TAKE ONE TABLET BY MOUTH EVERY DAY AS NEEDED FOR ANKLE OR LEG SWELLING/EDEMA 100 tablet 4   • atenolol (TENORMIN) 50 MG Tab Take 1 tablet by mouth every day. 100 tablet 4   • liothyronine (CYTOMEL) 25 MCG Tab Take 1 tablet by mouth every day. 100 tablet 4   • thyroid (ARMOUR THYROID) 120 MG Tab TAKE 1/2 TABLET ORALLY TWICE A  tablet 4   • thyroid (ARMOUR THYROID) 90 MG Tab TAKE 1 TABLET BY MOUTH ONCE DAILY 100 tablet 4   • traZODone (DESYREL) 150 MG Tab " Take 1 tablet by mouth at bedtime. 100 tablet 4   • nitrofurantoin (MACRODANTIN) 50 MG Cap TAKE ONE CAPSULE BY MOUTH FOUR TIMES A  Cap 4   • acetaminophen (TYLENOL) 500 MG Tab Take 500-1,000 mg by mouth every 6 hours as needed.     • Glucos-Chond-Hyal Ac-Ca Fructo (MOVE FREE [facility] ADVANCE) Tab Take  by mouth every day.       No current facility-administered medications for this visit.       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 (CMS-HCC)- RT 2015, dr russo; no change in mri 2017 10/10/2017   • Obesity (BMI 30.0-34.9) 10/10/2017   • Diplopia- dr gould and dr hernandez 10/02/2015   • Primary osteoarthritis of both hips- dr browne; also rx's  Norco 10/02/2015   • Impaired fasting glucose 04/29/2013   • Leg swelling-right, chronic- SUPPORT HOSE 10/24/2012   • Essential hypertension 08/23/2011   • Mixed hyperlipidemia 08/23/2011   • Hypothyroidism due to acquired atrophy of thyroid 08/23/2011   • Hypovitaminosis D 08/23/2011   • Primary insomnia 08/23/2011   • Obstructive sleep apnea syndrome 08/23/2011   • GERD (gastroesophageal reflux disease) 08/23/2011       Family History   Problem Relation Age of Onset   • Heart Attack Mother    • Cancer Father    • Hypertension Father    • Stroke Father        She  has a past medical history of Anesthesia, Arthritis, Bronchitis (6/2014), Cancer (HCC) (2015), Coughing blood, GERD (gastroesophageal reflux disease), Heart burn, Hiatus hernia syndrome, Hypertension, Indigestion, Pain (04/16/14), Pneumonia (12/2013), Renal disorder, Sleep apnea, Snoring, Thyroid disease, and Type II or unspecified type diabetes mellitus  "without mention of complication, not stated as uncontrolled.  She  has a past surgical history that includes tonsillectomy; cholecystectomy (1998); abdominal hysterectomy total (1980's); joint injection diagnostic (4/17/2014); joint injection diagnostic (8/28/2014); joint injection diagnostic (4/16/2015); and bunionectomy denice (Right, 12/15/2016).       Objective:   /63 (BP Location: Left arm, Patient Position: Sitting, BP Cuff Size: Adult) Comment: Pt. stated  Pulse 80 Comment: Pt. stated  Temp 36.4 °C (97.5 °F) (Temporal) Comment: Pt. stated  Ht 1.6 m (5' 3\") Comment: Pt. stated  Wt 67.1 kg (148 lb) Comment: Pt. stated  LMP  (LMP Unknown)   BMI 26.22 kg/m²     Physical Exam:      Constitutional: Alert, no distress, well-groomed.  Skin: No rashes in visible areas.  Eye: Round. Conjunctiva clear, lids normal. No icterus.   ENMT: Lips pink without lesions, good dentition, moist mucous membranes. Phonation normal.  Neck: No masses, no thyromegaly. Moves freely without pain.  Respiratory: Unlabored respiratory effort, no cough or audible wheeze  Psych: Alert and oriented x3, normal affect and mood.       Assessment and Plan:   The following treatment plan was discussed:     1. Essential hypertension-not at goal.  Continue atenolol 50 mg daily and amiloride 5 mg every other day as she has been doing.  Add lisinopril 5 mg daily to her regimen and if this does not bring blood pressure down she will follow-up with us in a few weeks after lab work is done and consider increasing the amiloride to daily.  Low-dose lisinopril prescribed in view of the potassium retaining properties of amiloride.    2. Hypothyroidism due to acquired atrophy of thyroid  - TSH; Future  Good control continue current regimen  3. Hypovitaminosis D-unknown control.  Recheck lab.  - VITAMIN D,25 HYDROXY; Future    4. Mixed hyperlipidemia-unknown control.  Recheck labs.  - TSH; Future  - Comp Metabolic Panel; Future  - Lipid Profile; " Future  - CBC WITH DIFFERENTIAL; Future    5. Impaired fasting glucose-unknown control.  Recheck labs.    6. 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  Continue surveillance.  7. 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    Addendum-patient advised to get COVID-19 vaccine.  Had long discussion regarding side effects and potential benefits of getting the vaccine.      Follow-up: No follow-ups on file.

## 2021-05-28 ENCOUNTER — HOSPITAL ENCOUNTER (OUTPATIENT)
Dept: LAB | Facility: MEDICAL CENTER | Age: 71
End: 2021-05-28
Attending: INTERNAL MEDICINE
Payer: MEDICARE

## 2021-05-28 DIAGNOSIS — E03.4 HYPOTHYROIDISM DUE TO ACQUIRED ATROPHY OF THYROID: ICD-10-CM

## 2021-05-28 DIAGNOSIS — E55.9 HYPOVITAMINOSIS D: ICD-10-CM

## 2021-05-28 DIAGNOSIS — E78.2 MIXED HYPERLIPIDEMIA: ICD-10-CM

## 2021-05-28 LAB
ALBUMIN SERPL BCP-MCNC: 3.8 G/DL (ref 3.2–4.9)
ALBUMIN/GLOB SERPL: 1.6 G/DL
ALP SERPL-CCNC: 137 U/L (ref 30–99)
ALT SERPL-CCNC: 15 U/L (ref 2–50)
ANION GAP SERPL CALC-SCNC: 10 MMOL/L (ref 7–16)
AST SERPL-CCNC: 19 U/L (ref 12–45)
BASOPHILS # BLD AUTO: 1.8 % (ref 0–1.8)
BASOPHILS # BLD: 0.07 K/UL (ref 0–0.12)
BILIRUB SERPL-MCNC: 0.7 MG/DL (ref 0.1–1.5)
BUN SERPL-MCNC: 15 MG/DL (ref 8–22)
CALCIUM SERPL-MCNC: 9.3 MG/DL (ref 8.5–10.5)
CHLORIDE SERPL-SCNC: 108 MMOL/L (ref 96–112)
CHOLEST SERPL-MCNC: 168 MG/DL (ref 100–199)
CO2 SERPL-SCNC: 21 MMOL/L (ref 20–33)
CREAT SERPL-MCNC: 0.51 MG/DL (ref 0.5–1.4)
EOSINOPHIL # BLD AUTO: 0.17 K/UL (ref 0–0.51)
EOSINOPHIL NFR BLD: 4.4 % (ref 0–6.9)
ERYTHROCYTE [DISTWIDTH] IN BLOOD BY AUTOMATED COUNT: 40.6 FL (ref 35.9–50)
FASTING STATUS PATIENT QL REPORTED: NORMAL
GLOBULIN SER CALC-MCNC: 2.4 G/DL (ref 1.9–3.5)
GLUCOSE SERPL-MCNC: 111 MG/DL (ref 65–99)
HCT VFR BLD AUTO: 45.2 % (ref 37–47)
HDLC SERPL-MCNC: 58 MG/DL
HGB BLD-MCNC: 15 G/DL (ref 12–16)
IMM GRANULOCYTES # BLD AUTO: 0.01 K/UL (ref 0–0.11)
IMM GRANULOCYTES NFR BLD AUTO: 0.3 % (ref 0–0.9)
LDLC SERPL CALC-MCNC: 95 MG/DL
LYMPHOCYTES # BLD AUTO: 1.13 K/UL (ref 1–4.8)
LYMPHOCYTES NFR BLD: 29 % (ref 22–41)
MCH RBC QN AUTO: 28.9 PG (ref 27–33)
MCHC RBC AUTO-ENTMCNC: 33.2 G/DL (ref 33.6–35)
MCV RBC AUTO: 87.1 FL (ref 81.4–97.8)
MONOCYTES # BLD AUTO: 0.38 K/UL (ref 0–0.85)
MONOCYTES NFR BLD AUTO: 9.7 % (ref 0–13.4)
NEUTROPHILS # BLD AUTO: 2.14 K/UL (ref 2–7.15)
NEUTROPHILS NFR BLD: 54.8 % (ref 44–72)
NRBC # BLD AUTO: 0 K/UL
NRBC BLD-RTO: 0 /100 WBC
PLATELET # BLD AUTO: 286 K/UL (ref 164–446)
PMV BLD AUTO: 10.3 FL (ref 9–12.9)
POTASSIUM SERPL-SCNC: 4.4 MMOL/L (ref 3.6–5.5)
PROT SERPL-MCNC: 6.2 G/DL (ref 6–8.2)
RBC # BLD AUTO: 5.19 M/UL (ref 4.2–5.4)
SODIUM SERPL-SCNC: 139 MMOL/L (ref 135–145)
TRIGL SERPL-MCNC: 73 MG/DL (ref 0–149)
TSH SERPL DL<=0.005 MIU/L-ACNC: <0.005 UIU/ML (ref 0.38–5.33)
WBC # BLD AUTO: 3.9 K/UL (ref 4.8–10.8)

## 2021-05-28 PROCEDURE — 85025 COMPLETE CBC W/AUTO DIFF WBC: CPT

## 2021-05-28 PROCEDURE — 80053 COMPREHEN METABOLIC PANEL: CPT

## 2021-05-28 PROCEDURE — 84443 ASSAY THYROID STIM HORMONE: CPT

## 2021-05-28 PROCEDURE — 80061 LIPID PANEL: CPT

## 2021-05-28 PROCEDURE — 36415 COLL VENOUS BLD VENIPUNCTURE: CPT

## 2021-05-28 PROCEDURE — 82306 VITAMIN D 25 HYDROXY: CPT

## 2021-05-31 LAB — 25(OH)D3 SERPL-MCNC: 20 NG/ML (ref 30–80)

## 2021-09-14 ENCOUNTER — OFFICE VISIT (OUTPATIENT)
Dept: PHYSICAL MEDICINE AND REHAB | Facility: MEDICAL CENTER | Age: 71
End: 2021-09-14
Payer: MEDICARE

## 2021-09-14 VITALS
TEMPERATURE: 96.5 F | SYSTOLIC BLOOD PRESSURE: 132 MMHG | RESPIRATION RATE: 20 BRPM | HEIGHT: 63 IN | HEART RATE: 72 BPM | WEIGHT: 185 LBS | OXYGEN SATURATION: 98 % | DIASTOLIC BLOOD PRESSURE: 76 MMHG | BODY MASS INDEX: 32.78 KG/M2

## 2021-09-14 DIAGNOSIS — M70.62 GREATER TROCHANTERIC BURSITIS OF BOTH HIPS: ICD-10-CM

## 2021-09-14 DIAGNOSIS — M70.61 GREATER TROCHANTERIC BURSITIS OF BOTH HIPS: ICD-10-CM

## 2021-09-14 DIAGNOSIS — M47.816 LUMBAR SPONDYLOSIS: ICD-10-CM

## 2021-09-14 DIAGNOSIS — M16.0 BILATERAL HIP JOINT ARTHRITIS: ICD-10-CM

## 2021-09-14 PROCEDURE — 99214 OFFICE O/P EST MOD 30 MIN: CPT | Performed by: PHYSICAL MEDICINE & REHABILITATION

## 2021-09-14 ASSESSMENT — PAIN SCALES - GENERAL: PAINLEVEL: 9=SEVERE PAIN

## 2021-09-14 ASSESSMENT — PATIENT HEALTH QUESTIONNAIRE - PHQ9: CLINICAL INTERPRETATION OF PHQ2 SCORE: 0

## 2021-09-14 ASSESSMENT — FIBROSIS 4 INDEX: FIB4 SCORE: 1.24

## 2021-09-14 NOTE — H&P (VIEW-ONLY)
Follow up patient note  Interventional spine and sports physiatry, Physical medicine rehabilitation      Chief complaint:   Chief Complaint   Patient presents with   • Follow-Up     Hip pain           HISTORY    Please see new patient note dated  by Dr Ball,  for more details.     HPI  Patient identification: Amanda Whitmore  1950,  female  With Diagnoses of Bilateral hip joint arthritis, Greater trochanteric bursitis of both hips, and Lumbar spondylosis were pertinent to this visit.     Previous bilateral hip injections provided excellent pain relief for the patient.  She has done very well with interventional procedures and wants to hold off on surgeries.  She does have back pain but this is typically related to her hip pain.  Overall these are 9 out of 10 in intensity constant worse with hip movements.  Nonradiating.  Denies recent trauma or injuries.    ROS Red Flags :   Fever, Chills, Sweats: Denies  Involuntary Weight Loss: Denies  Bowel/Bladder Incontinence: Denies  Saddle Anesthesia: Denies        PMHx:   Past Medical History:   Diagnosis Date   • Anesthesia     vertigo, PONV has trouble clearing anesthesia secondary to decreased kidney function   • Arthritis     hips, hands, knees   • Bronchitis 2014   • Cancer (HCC) 2015    brain, radiation 2015   • Coughing blood    • GERD (gastroesophageal reflux disease)     on med   • Heart burn    • Hiatus hernia syndrome    • Hypertension    • Indigestion    • Pain 14    bilateral hips 4/10   • Pneumonia 2013   • Renal disorder     chronic insufficiency 40-80% fx (per pt; most recent BUN/Creat were WNL); gets kidney infections easily   • Sleep apnea     uses breath right strips   • Snoring    • Thyroid disease    • Type II or unspecified type diabetes mellitus without mention of complication, not stated as uncontrolled     Diet controlled       PSHx:   Past Surgical History:   Procedure Laterality Date   • BUNIONECTOMY DIXIE Right  12/15/2016    Procedure: BUNIONECTOMY DIXIE TORRES;  Surgeon: Shankar Armas D.P.M.;  Location: SURGERY [facility];  Service:    • JOINT INJECTION DIAGNOSTIC  4/16/2015    Performed by Frank Robertson M.D. at SURGERY [facility]   • JOINT INJECTION DIAGNOSTIC  8/28/2014    Performed by Frank Robertson M.D. at SURGERY [facility]   • JOINT INJECTION DIAGNOSTIC  4/17/2014    Performed by Frank Robertson M.D. at SURGERY [facility]   • CHOLECYSTECTOMY  1998   • ABDOMINAL HYSTERECTOMY TOTAL  1980's   • TONSILLECTOMY      as a child       Family history   Family History   Problem Relation Age of Onset   • Heart Attack Mother    • Cancer Father    • Hypertension Father    • Stroke Father          Medications:   Outpatient Medications Marked as Taking for the 9/14/21 encounter (Office Visit) with Donal Ball M.D.   Medication Sig Dispense Refill   • AMILoride (MIDAMOR) 5 MG Tab TAKE ONE TABLET BY MOUTH EVERY DAY AS NEEDED FOR ANKLE OR LEG SWELLING/EDEMA 100 tablet 4   • atenolol (TENORMIN) 50 MG Tab Take 1 tablet by mouth every day. 100 tablet 4   • liothyronine (CYTOMEL) 25 MCG Tab Take 1 tablet by mouth every day. 100 tablet 4   • thyroid (ARMOUR THYROID) 120 MG Tab TAKE 1/2 TABLET ORALLY TWICE A  tablet 4   • thyroid (ARMOUR THYROID) 90 MG Tab TAKE 1 TABLET BY MOUTH ONCE DAILY 100 tablet 4   • traZODone (DESYREL) 150 MG Tab Take 1 tablet by mouth at bedtime. 100 tablet 4   • nitrofurantoin (MACRODANTIN) 50 MG Cap TAKE ONE CAPSULE BY MOUTH FOUR TIMES A  Cap 4   • acetaminophen (TYLENOL) 500 MG Tab Take 500-1,000 mg by mouth every 6 hours as needed.     • Glucos-Chond-Hyal Ac-Ca Fructo (MOVE FREE [facility] ADVANCE) Tab Take  by mouth every day.          Current Outpatient Medications on File Prior to Visit   Medication Sig Dispense Refill   • AMILoride (MIDAMOR) 5 MG Tab TAKE ONE TABLET BY MOUTH EVERY DAY AS NEEDED FOR ANKLE OR LEG SWELLING/EDEMA 100 tablet 4   • atenolol  "(TENORMIN) 50 MG Tab Take 1 tablet by mouth every day. 100 tablet 4   • liothyronine (CYTOMEL) 25 MCG Tab Take 1 tablet by mouth every day. 100 tablet 4   • thyroid (ARMOUR THYROID) 120 MG Tab TAKE 1/2 TABLET ORALLY TWICE A  tablet 4   • thyroid (ARMOUR THYROID) 90 MG Tab TAKE 1 TABLET BY MOUTH ONCE DAILY 100 tablet 4   • traZODone (DESYREL) 150 MG Tab Take 1 tablet by mouth at bedtime. 100 tablet 4   • nitrofurantoin (MACRODANTIN) 50 MG Cap TAKE ONE CAPSULE BY MOUTH FOUR TIMES A  Cap 4   • acetaminophen (TYLENOL) 500 MG Tab Take 500-1,000 mg by mouth every 6 hours as needed.     • Glucos-Chond-Hyal Ac-Ca Fructo (MOVE FREE [facility] ADVANCE) Tab Take  by mouth every day.       No current facility-administered medications on file prior to visit.         Allergies:   Allergies   Allergen Reactions   • Alcohol Anaphylaxis     ETOH   • Asa [Aspirin] Anaphylaxis   • Bee Anaphylaxis   • Iodine Hives and Shortness of Breath     Contrast; \"will black out\"   • Shellfish Allergy Anaphylaxis   • Sulfa Drugs Anaphylaxis     \"will kill me\"   • Tetanus Antitoxin Nausea and Swelling     \"Swelling and violently ill\"; increased temp!!!!!!   • Tape Contact Dermatitis     Paper tape ok; will get welts and peeling skin with regular tape   • Influenza Vaccines Unspecified     Local reaction with deltoid pain swelling and swelling   • Other Environmental      Pt states that she is allergic \"to the sun\"   • Synthroid [Levothroid]      Nausea fatigue malaise       Social Hx:   Social History     Socioeconomic History   • Marital status:      Spouse name: Not on file   • Number of children: Not on file   • Years of education: Not on file   • Highest education level: Not on file   Occupational History   • Not on file   Tobacco Use   • Smoking status: Former Smoker     Packs/day: 1.00     Years: 4.00     Pack years: 4.00     Types: Cigarettes     Quit date: 1965     Years since quittin.7   • Smokeless tobacco: " "Former User   Vaping Use   • Vaping Use: Never used   Substance and Sexual Activity   • Alcohol use: No     Comment: ALLERGIC TO IT   • Drug use: No   • Sexual activity: Not Currently   Other Topics Concern   •  Service No   • Blood Transfusions No   • Caffeine Concern No   • Occupational Exposure No   • Hobby Hazards No   • Sleep Concern Yes   • Stress Concern No   • Weight Concern No   • Special Diet No   • Back Care No   • Exercise No   • Bike Helmet No   • Seat Belt Yes   • Self-Exams Yes   Social History Narrative   • Not on file     Social Determinants of Health     Financial Resource Strain:    • Difficulty of Paying Living Expenses:    Food Insecurity:    • Worried About Running Out of Food in the Last Year:    • Ran Out of Food in the Last Year:    Transportation Needs:    • Lack of Transportation (Medical):    • Lack of Transportation (Non-Medical):    Physical Activity:    • Days of Exercise per Week:    • Minutes of Exercise per Session:    Stress:    • Feeling of Stress :    Social Connections:    • Frequency of Communication with Friends and Family:    • Frequency of Social Gatherings with Friends and Family:    • Attends [employer] Services:    • Active Member of Clubs or Organizations:    • Attends Club or Organization Meetings:    • Marital Status:    Intimate Partner Violence:    • Fear of Current or Ex-Partner:    • Emotionally Abused:    • Physically Abused:    • Sexually Abused:          EXAMINATION     Physical Exam:   Vitals: /76 (BP Location: Right arm, Patient Position: Sitting, BP Cuff Size: Adult)   Pulse 72   Temp 35.8 °C (96.5 °F) (Temporal)   Resp 20   Ht 1.6 m (5' 2.99\")   Wt 83.9 kg (185 lb)   SpO2 98%     Constitutional:   Body Habitus: Body mass index is 32.78 kg/m².  Cooperation: Fully cooperates with exam  Appearance: Well-groomed no disheveled    Respiratory-  breathing comfortable on room air, no audible wheezing  Cardiovascular- capillary refills less than 2 " seconds. No lower extremity edema is noted.   Psychiatric- alert and oriented ×3. Normal affect.    MSK: -  Strength is 5 out of 5 in the bilateral lower extremities.  Straight leg raise is negative bilaterally.  FAIRs maneuver is positive bilaterally which reproduces anterior hip, lateral hip and posterior hip pain      MEDICAL DECISION MAKING    DATA    Labs:   Lab Results   Component Value Date/Time    SODIUM 139 2021 06:18 AM    POTASSIUM 4.4 2021 06:18 AM    CHLORIDE 108 2021 06:18 AM    CO2 21 2021 06:18 AM    GLUCOSE 111 (H) 2021 06:18 AM    BUN 15 2021 06:18 AM    CREATININE 0.51 2021 06:18 AM    CREATININE 0.63 2013 08:06 AM    BUNCREATRAT 29 (H) 2013 08:11 AM    BUNCREATRAT 29 (H) 2013 08:06 AM        No results found for: PROTHROMBTM, INR     Lab Results   Component Value Date/Time    WBC 3.9 (L) 2021 06:18 AM    WBC 4.7 2013 08:06 AM    RBC 5.19 2021 06:18 AM    RBC 5.14 2013 08:06 AM    HEMOGLOBIN 15.0 2021 06:18 AM    HEMATOCRIT 45.2 2021 06:18 AM    MCV 87.1 2021 06:18 AM    MCV 81 2013 08:06 AM    MCH 28.9 2021 06:18 AM    MCH 27.6 2013 08:06 AM    MCHC 33.2 (L) 2021 06:18 AM    MPV 10.3 2021 06:18 AM    NEUTSPOLYS 54.80 2021 06:18 AM    LYMPHOCYTES 29.00 2021 06:18 AM    MONOCYTES 9.70 2021 06:18 AM    EOSINOPHILS 4.40 2021 06:18 AM    BASOPHILS 1.80 2021 06:18 AM    HYPOCHROMIA 1+ 2014 06:04 AM        Lab Results   Component Value Date/Time    HBA1C 5.8 (H) 10/23/2019 06:08 AM            DIAGNOSIS   Visit Diagnoses     ICD-10-CM   1. Bilateral hip joint arthritis  M16.0   2. Greater trochanteric bursitis of both hips  M70.61    M70.62   3. Lumbar spondylosis  M47.816         ASSESSMENT and PLAN:     Amanda Carr Haim  1950,   female      Amanda Carr was seen today for follow-up.    Diagnoses and all orders for this  visit:    Bilateral hip joint arthritis  -     REFERRAL TO PHYSICAL MEDICINE REHAB    Greater trochanteric bursitis of both hips    Lumbar spondylosis      The patient has done very well in the past with hip injections with the last injection lasting for a year.  She is not interested in surgical intervention.    I have ordered bilateral intra-articular hip injections with fluoroscopic guidance    The risks benefits and alternatives to this procedure were discussed and the patient wishes to proceed with the procedure. Risks include but are not limited to damage to surrounding structures, infection, bleeding, worsening of pain which can be permanent, weakness which can be permanent. Benefits include pain relief, improved function. Alternatives includes not doing the procedure.      She does have lumbar spondylosis and low back pain however this typically improves after hip injection so this is likely related to the patient's hips.    She can use Tylenol up to 1000 mg 3 times daily as needed pain    Thank you for allowing me to participate in the care of this patient. If you have any questions please not hesitate to contact me.          Please note that this dictation was created using voice recognition software. I have made every reasonable attempt to correct obvious errors but there may be errors of grammar and content that I may have overlooked prior to finalization of this note.      Donal Ball MD  Interventional Spine and Sports Physiatry  Physical Medicine and Rehabilitation  [facility]

## 2021-09-14 NOTE — PATIENT INSTRUCTIONS
Your procedure will be at the [facility] special procedure suite.    [facility] [place] [address]       PRE-PROCEDURE INSTRUCTIONS  You may take your regular medications except:   · No Anti-inflammatories 5 days prior to your procedure. Anti-inflammatories include medicines such as  ibuprofen (Motrin, Advil), Excedrin, Naproxen (Aleve, Anaprox, Naprelan, Naprosyn), Celecoxib (Celebrex), Diclofenac (Voltaren-XR tab), and Meloxicam (Mobic).   · You can take the remainder of your pain medications as prescribed.   · If you are having a diagnostic procedure such as a medial branch block, do not use her pain meds on the day of the procedure  · No Glucophage or Metformin 24 hours before your procedure. You may resume next day after your procedure.  · Call the physiatry office if you are taking or prescribed anti-biotics within five days of procedure.  · Please ask provider if you are taking any new diabetes medication.  · CONTINUE TAKING BLOOD PRESSURE MEDICATIONS AS PRESCRIBED.  · Pain medications will not be prescribed on the procedure day. Procedural pain medication may be used by your provider   · Call your doctor's office performing the procedure if you have a fever, chills, rash or new illness prior to your procedure    Anticoagulation/antiplatelet medications  No Blood thinning medications such as Coumadin, Xarelto, aspirin or Plavix 5 days prior to procedure unless your doctor said to continue these medications. Call your doctor if a new medication is prescribed in this class.     Restrictions for eating before procedure:   · If you are getting procedural sedation, then do not eat to for 8 hours prior to procedure appointment time. Do not drink fluids for four hours prior to your procedure time.   · If you are not having procedural sedation, then Skip the meal prior to your procedure. If you have a morning procedure then skip breakfast. If you have an afternoon procedure then skip lunch.   · You  may drink clear liquids up to 2 hours prior to your procedure  · You must have a  the day of procedure to accompany you home.      POST PROCEDURE INSTRUCTIONS   · No heavy lifting, strenuous bending or strenuous exercise for 3 days after your procedure.  · No hot tubs, baths, swimming for 3 days after your procedure  · You can remove the bandage the day after the procedure.  · IF YOU RECEIVED A STEROID INJECTION. PLEASE NOTE THAT THERE MAY BE A DELAY FOR THE INJECTION TO START WORKING, THE DELAY MAY BE UP TO TWO WEEKS. IF YOU HAVE DIABETES, PLEASE NOTE THAT YOUR SUGAR LEVELS MAY BE ELEVATED FOR 1-2 DAYS AFTER A STEROID INJECTION.  THE STEROID MAY CAUSE TEMPORARY SYMPTOMS WHICH USUALLY RESOLVE ON THEIR OWN WITHIN 1 TO 2 DAYS INCLUDING FACIAL FLUSHING OR A FEELING OF WARMTH ON THE FACE, TEMPORARY INCREASES IN BLOOD SUGAR, INSOMNIA, INCREASED HUNGER  · IF YOU EXPERIENCE PROLONGED WEAKNESS LONGER THAN ONE DAY, BOWEL OR BLADDER INCONTINENCE THEN PLEASE CALL THE PHYSIATRY OFFICE.  · Your leg may feel heavy, weak and numb for up to 1-2 days. Be very careful walking.   ·  You may resume normal activities 3 days after procedure.

## 2021-09-14 NOTE — PROGRESS NOTES
Follow up patient note  Interventional spine and sports physiatry, Physical medicine rehabilitation      Chief complaint:   Chief Complaint   Patient presents with   • Follow-Up     Hip pain           HISTORY    Please see new patient note dated  by Dr Ball,  for more details.     HPI  Patient identification: Amanda Whitmore  1950,  female  With Diagnoses of Bilateral hip joint arthritis, Greater trochanteric bursitis of both hips, and Lumbar spondylosis were pertinent to this visit.     Previous bilateral hip injections provided excellent pain relief for the patient.  She has done very well with interventional procedures and wants to hold off on surgeries.  She does have back pain but this is typically related to her hip pain.  Overall these are 9 out of 10 in intensity constant worse with hip movements.  Nonradiating.  Denies recent trauma or injuries.    ROS Red Flags :   Fever, Chills, Sweats: Denies  Involuntary Weight Loss: Denies  Bowel/Bladder Incontinence: Denies  Saddle Anesthesia: Denies        PMHx:   Past Medical History:   Diagnosis Date   • Anesthesia     vertigo, PONV has trouble clearing anesthesia secondary to decreased kidney function   • Arthritis     hips, hands, knees   • Bronchitis 2014   • Cancer (HCC) 2015    brain, radiation 2015   • Coughing blood    • GERD (gastroesophageal reflux disease)     on med   • Heart burn    • Hiatus hernia syndrome    • Hypertension    • Indigestion    • Pain 14    bilateral hips 4/10   • Pneumonia 2013   • Renal disorder     chronic insufficiency 40-80% fx (per pt; most recent BUN/Creat were WNL); gets kidney infections easily   • Sleep apnea     uses breath right strips   • Snoring    • Thyroid disease    • Type II or unspecified type diabetes mellitus without mention of complication, not stated as uncontrolled     Diet controlled       PSHx:   Past Surgical History:   Procedure Laterality Date   • BUNIONECTOMY DIXIE Right  12/15/2016    Procedure: BUNIONECTOMY DIXIE TORRES;  Surgeon: Shankar Armas D.P.M.;  Location: SURGERY [facility];  Service:    • JOINT INJECTION DIAGNOSTIC  4/16/2015    Performed by Frank Robertson M.D. at SURGERY [facility]   • JOINT INJECTION DIAGNOSTIC  8/28/2014    Performed by Frank Robertson M.D. at SURGERY [facility]   • JOINT INJECTION DIAGNOSTIC  4/17/2014    Performed by Frank Robertson M.D. at SURGERY [facility]   • CHOLECYSTECTOMY  1998   • ABDOMINAL HYSTERECTOMY TOTAL  1980's   • TONSILLECTOMY      as a child       Family history   Family History   Problem Relation Age of Onset   • Heart Attack Mother    • Cancer Father    • Hypertension Father    • Stroke Father          Medications:   Outpatient Medications Marked as Taking for the 9/14/21 encounter (Office Visit) with Donal Ball M.D.   Medication Sig Dispense Refill   • AMILoride (MIDAMOR) 5 MG Tab TAKE ONE TABLET BY MOUTH EVERY DAY AS NEEDED FOR ANKLE OR LEG SWELLING/EDEMA 100 tablet 4   • atenolol (TENORMIN) 50 MG Tab Take 1 tablet by mouth every day. 100 tablet 4   • liothyronine (CYTOMEL) 25 MCG Tab Take 1 tablet by mouth every day. 100 tablet 4   • thyroid (ARMOUR THYROID) 120 MG Tab TAKE 1/2 TABLET ORALLY TWICE A  tablet 4   • thyroid (ARMOUR THYROID) 90 MG Tab TAKE 1 TABLET BY MOUTH ONCE DAILY 100 tablet 4   • traZODone (DESYREL) 150 MG Tab Take 1 tablet by mouth at bedtime. 100 tablet 4   • nitrofurantoin (MACRODANTIN) 50 MG Cap TAKE ONE CAPSULE BY MOUTH FOUR TIMES A  Cap 4   • acetaminophen (TYLENOL) 500 MG Tab Take 500-1,000 mg by mouth every 6 hours as needed.     • Glucos-Chond-Hyal Ac-Ca Fructo (MOVE FREE [facility] ADVANCE) Tab Take  by mouth every day.          Current Outpatient Medications on File Prior to Visit   Medication Sig Dispense Refill   • AMILoride (MIDAMOR) 5 MG Tab TAKE ONE TABLET BY MOUTH EVERY DAY AS NEEDED FOR ANKLE OR LEG SWELLING/EDEMA 100 tablet 4   • atenolol  "(TENORMIN) 50 MG Tab Take 1 tablet by mouth every day. 100 tablet 4   • liothyronine (CYTOMEL) 25 MCG Tab Take 1 tablet by mouth every day. 100 tablet 4   • thyroid (ARMOUR THYROID) 120 MG Tab TAKE 1/2 TABLET ORALLY TWICE A  tablet 4   • thyroid (ARMOUR THYROID) 90 MG Tab TAKE 1 TABLET BY MOUTH ONCE DAILY 100 tablet 4   • traZODone (DESYREL) 150 MG Tab Take 1 tablet by mouth at bedtime. 100 tablet 4   • nitrofurantoin (MACRODANTIN) 50 MG Cap TAKE ONE CAPSULE BY MOUTH FOUR TIMES A  Cap 4   • acetaminophen (TYLENOL) 500 MG Tab Take 500-1,000 mg by mouth every 6 hours as needed.     • Glucos-Chond-Hyal Ac-Ca Fructo (MOVE FREE [facility] ADVANCE) Tab Take  by mouth every day.       No current facility-administered medications on file prior to visit.         Allergies:   Allergies   Allergen Reactions   • Alcohol Anaphylaxis     ETOH   • Asa [Aspirin] Anaphylaxis   • Bee Anaphylaxis   • Iodine Hives and Shortness of Breath     Contrast; \"will black out\"   • Shellfish Allergy Anaphylaxis   • Sulfa Drugs Anaphylaxis     \"will kill me\"   • Tetanus Antitoxin Nausea and Swelling     \"Swelling and violently ill\"; increased temp!!!!!!   • Tape Contact Dermatitis     Paper tape ok; will get welts and peeling skin with regular tape   • Influenza Vaccines Unspecified     Local reaction with deltoid pain swelling and swelling   • Other Environmental      Pt states that she is allergic \"to the sun\"   • Synthroid [Levothroid]      Nausea fatigue malaise       Social Hx:   Social History     Socioeconomic History   • Marital status:      Spouse name: Not on file   • Number of children: Not on file   • Years of education: Not on file   • Highest education level: Not on file   Occupational History   • Not on file   Tobacco Use   • Smoking status: Former Smoker     Packs/day: 1.00     Years: 4.00     Pack years: 4.00     Types: Cigarettes     Quit date: 1965     Years since quittin.7   • Smokeless tobacco: " "Former User   Vaping Use   • Vaping Use: Never used   Substance and Sexual Activity   • Alcohol use: No     Comment: ALLERGIC TO IT   • Drug use: No   • Sexual activity: Not Currently   Other Topics Concern   •  Service No   • Blood Transfusions No   • Caffeine Concern No   • Occupational Exposure No   • Hobby Hazards No   • Sleep Concern Yes   • Stress Concern No   • Weight Concern No   • Special Diet No   • Back Care No   • Exercise No   • Bike Helmet No   • Seat Belt Yes   • Self-Exams Yes   Social History Narrative   • Not on file     Social Determinants of Health     Financial Resource Strain:    • Difficulty of Paying Living Expenses:    Food Insecurity:    • Worried About Running Out of Food in the Last Year:    • Ran Out of Food in the Last Year:    Transportation Needs:    • Lack of Transportation (Medical):    • Lack of Transportation (Non-Medical):    Physical Activity:    • Days of Exercise per Week:    • Minutes of Exercise per Session:    Stress:    • Feeling of Stress :    Social Connections:    • Frequency of Communication with Friends and Family:    • Frequency of Social Gatherings with Friends and Family:    • Attends Rastafari Services:    • Active Member of Clubs or Organizations:    • Attends Club or Organization Meetings:    • Marital Status:    Intimate Partner Violence:    • Fear of Current or Ex-Partner:    • Emotionally Abused:    • Physically Abused:    • Sexually Abused:          EXAMINATION     Physical Exam:   Vitals: /76 (BP Location: Right arm, Patient Position: Sitting, BP Cuff Size: Adult)   Pulse 72   Temp 35.8 °C (96.5 °F) (Temporal)   Resp 20   Ht 1.6 m (5' 2.99\")   Wt 83.9 kg (185 lb)   SpO2 98%     Constitutional:   Body Habitus: Body mass index is 32.78 kg/m².  Cooperation: Fully cooperates with exam  Appearance: Well-groomed no disheveled    Respiratory-  breathing comfortable on room air, no audible wheezing  Cardiovascular- capillary refills less than 2 " seconds. No lower extremity edema is noted.   Psychiatric- alert and oriented ×3. Normal affect.    MSK: -  Strength is 5 out of 5 in the bilateral lower extremities.  Straight leg raise is negative bilaterally.  FAIRs maneuver is positive bilaterally which reproduces anterior hip, lateral hip and posterior hip pain      MEDICAL DECISION MAKING    DATA    Labs:   Lab Results   Component Value Date/Time    SODIUM 139 2021 06:18 AM    POTASSIUM 4.4 2021 06:18 AM    CHLORIDE 108 2021 06:18 AM    CO2 21 2021 06:18 AM    GLUCOSE 111 (H) 2021 06:18 AM    BUN 15 2021 06:18 AM    CREATININE 0.51 2021 06:18 AM    CREATININE 0.63 2013 08:06 AM    BUNCREATRAT 29 (H) 2013 08:11 AM    BUNCREATRAT 29 (H) 2013 08:06 AM        No results found for: PROTHROMBTM, INR     Lab Results   Component Value Date/Time    WBC 3.9 (L) 2021 06:18 AM    WBC 4.7 2013 08:06 AM    RBC 5.19 2021 06:18 AM    RBC 5.14 2013 08:06 AM    HEMOGLOBIN 15.0 2021 06:18 AM    HEMATOCRIT 45.2 2021 06:18 AM    MCV 87.1 2021 06:18 AM    MCV 81 2013 08:06 AM    MCH 28.9 2021 06:18 AM    MCH 27.6 2013 08:06 AM    MCHC 33.2 (L) 2021 06:18 AM    MPV 10.3 2021 06:18 AM    NEUTSPOLYS 54.80 2021 06:18 AM    LYMPHOCYTES 29.00 2021 06:18 AM    MONOCYTES 9.70 2021 06:18 AM    EOSINOPHILS 4.40 2021 06:18 AM    BASOPHILS 1.80 2021 06:18 AM    HYPOCHROMIA 1+ 2014 06:04 AM        Lab Results   Component Value Date/Time    HBA1C 5.8 (H) 10/23/2019 06:08 AM            DIAGNOSIS   Visit Diagnoses     ICD-10-CM   1. Bilateral hip joint arthritis  M16.0   2. Greater trochanteric bursitis of both hips  M70.61    M70.62   3. Lumbar spondylosis  M47.816         ASSESSMENT and PLAN:     Amanda Carr Haim  1950,   female      Amanda Carr was seen today for follow-up.    Diagnoses and all orders for this  visit:    Bilateral hip joint arthritis  -     REFERRAL TO PHYSICAL MEDICINE REHAB    Greater trochanteric bursitis of both hips    Lumbar spondylosis      The patient has done very well in the past with hip injections with the last injection lasting for a year.  She is not interested in surgical intervention.    I have ordered bilateral intra-articular hip injections with fluoroscopic guidance    The risks benefits and alternatives to this procedure were discussed and the patient wishes to proceed with the procedure. Risks include but are not limited to damage to surrounding structures, infection, bleeding, worsening of pain which can be permanent, weakness which can be permanent. Benefits include pain relief, improved function. Alternatives includes not doing the procedure.      She does have lumbar spondylosis and low back pain however this typically improves after hip injection so this is likely related to the patient's hips.    She can use Tylenol up to 1000 mg 3 times daily as needed pain    Thank you for allowing me to participate in the care of this patient. If you have any questions please not hesitate to contact me.          Please note that this dictation was created using voice recognition software. I have made every reasonable attempt to correct obvious errors but there may be errors of grammar and content that I may have overlooked prior to finalization of this note.      Donal Ball MD  Interventional Spine and Sports Physiatry  Physical Medicine and Rehabilitation  [facility]

## 2021-09-21 ENCOUNTER — APPOINTMENT (OUTPATIENT)
Dept: RADIOLOGY | Facility: REHABILITATION | Age: 71
End: 2021-09-21
Attending: PHYSICAL MEDICINE & REHABILITATION
Payer: MEDICARE

## 2021-09-21 ENCOUNTER — HOSPITAL ENCOUNTER (OUTPATIENT)
Facility: REHABILITATION | Age: 71
End: 2021-09-21
Attending: PHYSICAL MEDICINE & REHABILITATION | Admitting: PHYSICAL MEDICINE & REHABILITATION
Payer: MEDICARE

## 2021-09-21 VITALS
WEIGHT: 185 LBS | TEMPERATURE: 98.7 F | OXYGEN SATURATION: 98 % | HEIGHT: 63 IN | DIASTOLIC BLOOD PRESSURE: 91 MMHG | SYSTOLIC BLOOD PRESSURE: 152 MMHG | BODY MASS INDEX: 32.78 KG/M2 | HEART RATE: 72 BPM

## 2021-09-21 PROCEDURE — 700111 HCHG RX REV CODE 636 W/ 250 OVERRIDE (IP)

## 2021-09-21 PROCEDURE — 20610 DRAIN/INJ JOINT/BURSA W/O US: CPT

## 2021-09-21 PROCEDURE — 700117 HCHG RX CONTRAST REV CODE 255

## 2021-09-21 PROCEDURE — A9585 GADOBUTROL INJECTION: HCPCS

## 2021-09-21 RX ORDER — GADOBUTROL 604.72 MG/ML
INJECTION INTRAVENOUS
Status: COMPLETED
Start: 2021-09-21 | End: 2021-09-21

## 2021-09-21 RX ORDER — LIDOCAINE HYDROCHLORIDE 10 MG/ML
INJECTION, SOLUTION EPIDURAL; INFILTRATION; INTRACAUDAL; PERINEURAL
Status: COMPLETED
Start: 2021-09-21 | End: 2021-09-21

## 2021-09-21 RX ORDER — DEXAMETHASONE SODIUM PHOSPHATE 10 MG/ML
INJECTION, SOLUTION INTRAMUSCULAR; INTRAVENOUS
Status: COMPLETED
Start: 2021-09-21 | End: 2021-09-21

## 2021-09-21 RX ADMIN — GADOBUTROL 5 ML: 604.72 INJECTION INTRAVENOUS at 10:23

## 2021-09-21 RX ADMIN — LIDOCAINE HYDROCHLORIDE 5 ML: 10 INJECTION, SOLUTION EPIDURAL; INFILTRATION; INTRACAUDAL; PERINEURAL at 10:26

## 2021-09-21 RX ADMIN — LIDOCAINE HYDROCHLORIDE 10 ML: 10 INJECTION, SOLUTION EPIDURAL; INFILTRATION; INTRACAUDAL; PERINEURAL at 10:20

## 2021-09-21 RX ADMIN — DEXAMETHASONE SODIUM PHOSPHATE 20 MG: 10 INJECTION, SOLUTION INTRAMUSCULAR; INTRAVENOUS at 10:24

## 2021-09-21 ASSESSMENT — PAIN DESCRIPTION - PAIN TYPE
TYPE: CHRONIC PAIN
TYPE: CHRONIC PAIN

## 2021-09-21 ASSESSMENT — FIBROSIS 4 INDEX: FIB4 SCORE: 1.24

## 2021-09-21 NOTE — PROGRESS NOTES
0931 Pt received to pre procedure area. ID band and allergies verified. Vital signs taken and stable. Pertinent medical history (Fibromyalgia, DM Type 2) reviewed and communicated to procedure RN. Verified that patient has not taken NSAIDS, anticoagulants or blood thinners in past 5 days. Reviewed post op instructions with patient, questions answered, verbalized understanding.

## 2021-09-21 NOTE — PROGRESS NOTES
1012 AM.     Onto procedure table  on supine positioned and necessary monitoring equipment connected (  pulse oximeter, BP ) with the help by  team  (  CST, X-ray TECH and RN ). Hands at side of the bed , lower extremities supported with pillow.      1019 AM.        Identified patient, medication allergies, site and procedure confirmed and rashawn by Dr. Ball. Reviewed medical health history  ( HTN, MERRITT, osteoarthritis, IFG, GERD, controlled type 2 DM ).  Timed out agreed by team and patient.

## 2021-09-21 NOTE — PROGRESS NOTES
1030  Pt received to recovery area, report received from procedure RN Cristal . Vitals taken and stable. Patient tolerated po fluids and snack without difficulty. Dressing clean, dry and intact. Ice pack applied over dressing. Patient ambulatory without difficulty.     1050  Dr. Ball here and spoke with patient prior to discharge. Pt denies pain. Pt discharged to designated .

## 2021-09-21 NOTE — OP REPORT
Date of Service: 9/21/2021     Patient: Amanda Whitmore 70 y.o. female     MRN: [id number]     Physician/s: Donal Ball MD    Pre-operative Diagnosis: BILATERAL  hip osteoarthritis    Post-operative Diagnosis: BILATERAL hip osteoarthritis    Procedure: BILATERAL intra-articular Hip injection with fluoroscopic guidance    Description of procedure:    The risks, benefits, and alternatives of the procedure were reviewed and discussed with the patient.  Written informed consent was freely obtained. A pre-procedural time-out was conducted by the physician verifying patient’s identity, procedure to be performed, procedure site and side, and allergy verification. Appropriate equipment was determined to be in place for the procedure.         The femoral artery nerve and vein as well as the inguinal ligament were identified with ultrasound and marked with a marking pen.  The entire procedure took place lateral to the femoral vessels and nerve and inferior to the inguinal ligament.    In the procedure suite the patient was placed in a supine position with and the skin was prepped and draped in the usual sterile fashion. A 27-gauge 1.5 inch needle was used with approximately 2 mL of 1% lidocaine for local anesthesia at the junction of the femoral head and neck.  A 25g 3.5 inch needle was placed into skin and advanced under fluoroscopic guidance into the joint space of the left hip.  Approximately 2.5 mL of contrast was used to clearly demonstrate the outlining of the joint capsule. Following negative aspiration, 5mL of 1% lidocaine with 1mL of 10mg/mL dexamethasone was then injected into the joint, and the needle was subsequently removed intact after restyleted.     The exact same procedure was repeated on the right hip    The patient's hip was wiped with a 4x4 gauze, the area was cleansed with alcohol prep, and a bandaid was applied. There were no complications noted.         Follow-up as scheduled      Donal Ball  MD  Interventional Spine and Pain  Physical Medicine and Rehabilitation  [facility]          CPT  Major joint/bursa:  03562-47  Fluoroscopic  needle guidance [id number]

## 2021-09-27 ENCOUNTER — TELEPHONE (OUTPATIENT)
Dept: PHYSICAL MEDICINE AND REHAB | Facility: MEDICAL CENTER | Age: 71
End: 2021-09-27

## 2021-09-27 NOTE — TELEPHONE ENCOUNTER
----- Message from Arun Dawson sent at 9/27/2021  1:02 PM PDT -----  Regarding: patient needs sooner apt  Patient called and is still in sever pain after 1st injection. She has an apt next month and can not waiting that long to see Dr. Ball . She also stated she didn't receive a vera injection and that's what she would like.    She would like a call back as soon as you can.      Thank you     Arun

## 2021-10-21 ENCOUNTER — OFFICE VISIT (OUTPATIENT)
Dept: PHYSICAL MEDICINE AND REHAB | Facility: MEDICAL CENTER | Age: 71
End: 2021-10-21
Payer: MEDICARE

## 2021-10-21 VITALS
DIASTOLIC BLOOD PRESSURE: 72 MMHG | HEART RATE: 81 BPM | WEIGHT: 175 LBS | TEMPERATURE: 96.9 F | BODY MASS INDEX: 31.01 KG/M2 | SYSTOLIC BLOOD PRESSURE: 138 MMHG | OXYGEN SATURATION: 95 % | HEIGHT: 63 IN

## 2021-10-21 DIAGNOSIS — M70.62 GREATER TROCHANTERIC BURSITIS OF BOTH HIPS: ICD-10-CM

## 2021-10-21 DIAGNOSIS — M47.816 LUMBAR SPONDYLOSIS: ICD-10-CM

## 2021-10-21 DIAGNOSIS — M70.61 GREATER TROCHANTERIC BURSITIS OF BOTH HIPS: ICD-10-CM

## 2021-10-21 DIAGNOSIS — M16.0 BILATERAL HIP JOINT ARTHRITIS: ICD-10-CM

## 2021-10-21 PROCEDURE — 99214 OFFICE O/P EST MOD 30 MIN: CPT | Mod: 25 | Performed by: PHYSICAL MEDICINE & REHABILITATION

## 2021-10-21 PROCEDURE — 20611 DRAIN/INJ JOINT/BURSA W/US: CPT | Mod: 50 | Performed by: PHYSICAL MEDICINE & REHABILITATION

## 2021-10-21 RX ORDER — DEXAMETHASONE SODIUM PHOSPHATE 4 MG/ML
8 INJECTION, SOLUTION INTRA-ARTICULAR; INTRALESIONAL; INTRAMUSCULAR; INTRAVENOUS; SOFT TISSUE ONCE
Status: COMPLETED | OUTPATIENT
Start: 2021-10-21 | End: 2021-10-21

## 2021-10-21 RX ADMIN — DEXAMETHASONE SODIUM PHOSPHATE 8 MG: 4 INJECTION, SOLUTION INTRA-ARTICULAR; INTRALESIONAL; INTRAMUSCULAR; INTRAVENOUS; SOFT TISSUE at 11:31

## 2021-10-21 ASSESSMENT — FIBROSIS 4 INDEX: FIB4 SCORE: 1.24

## 2021-10-21 ASSESSMENT — PATIENT HEALTH QUESTIONNAIRE - PHQ9: CLINICAL INTERPRETATION OF PHQ2 SCORE: 0

## 2021-10-21 ASSESSMENT — PAIN SCALES - GENERAL: PAINLEVEL: NO PAIN

## 2021-10-21 NOTE — PROGRESS NOTES
Follow up patient note  Interventional spine and sports physiatry, Physical medicine rehabilitation      Chief complaint:   Chief Complaint   Patient presents with   • Follow-Up     Hip pain          HISTORY    Please see new patient note dated  by Dr Ball,  for more details.     HPI  Patient identification: Amanda Whitmore  1950,  female  With Diagnoses of Greater trochanteric bursitis of both hips, Bilateral hip joint arthritis, and Lumbar spondylosis were pertinent to this visit.     Status post intra-articular bilateral hip injections with 100% pain relief in the groin anterior thigh and posterior hip.  Patient still has pain in the lateral hip towards the greater trochanter worse with laying on her sides worse with walking standing bending.  These are causing functional deficits.  8 out of 10 in intensity.  Nonradiating pain.  She denies numbness tingling weakness.  Low back pain is well controlled      ROS Red Flags :   Fever, Chills, Sweats: Denies  Involuntary Weight Loss: Denies  Bowel/Bladder Incontinence: Denies  Saddle Anesthesia: Denies        PMHx:   Past Medical History:   Diagnosis Date   • Anesthesia     vertigo, PONV has trouble clearing anesthesia secondary to decreased kidney function   • Arthritis     hips, hands, knees   • Bronchitis 2014   • Cancer (HCC) 2015    brain, radiation 2015   • Coughing blood    • GERD (gastroesophageal reflux disease)     on med   • Heart burn    • Hiatus hernia syndrome    • Hypertension    • Indigestion    • Pain 14    bilateral hips 4/10   • Pneumonia 2013   • Renal disorder     chronic insufficiency 40-80% fx (per pt; most recent BUN/Creat were WNL); gets kidney infections easily   • Sleep apnea     uses breath right strips   • Snoring    • Thyroid disease    • Type II or unspecified type diabetes mellitus without mention of complication, not stated as uncontrolled     Diet controlled       PSHx:   Past Surgical History:    Procedure Laterality Date   • ND DRAIN/INJECT LARGE JOINT/BURSA Bilateral 9/21/2021    Procedure: BILATERAL hip injection;  Surgeon: Donal Ball M.D.;  [facility];  Service: Pain Management   • ND FLUOROSCOPIC GUIDANCE NEEDLE PLACEMENT Bilateral 9/21/2021    Procedure: .;  Surgeon: Donal Ball M.D.;  Location: [facility];  Service: Pain Management   • BUNIONECTOMY DIXIE Right 12/15/2016    Procedure: BUNIONECTOMY DIXIE - BRIAN;  Surgeon: Shankar Armas D.P.M.;  Location: SURGERY [facility];  Service:    • JOINT INJECTION DIAGNOSTIC  4/16/2015    Performed by Frank Robertson M.D. at SURGERY [facility]   • JOINT INJECTION DIAGNOSTIC  8/28/2014    Performed by Frank Robertson M.D. at SURGERY [facility]   • JOINT INJECTION DIAGNOSTIC  4/17/2014    Performed by Frank Robertson M.D. at SURGERY [facility]   • CHOLECYSTECTOMY  1998   • ABDOMINAL HYSTERECTOMY TOTAL  1980's   • TONSILLECTOMY      as a child       Family history   Family History   Problem Relation Age of Onset   • Heart Attack Mother    • Cancer Father    • Hypertension Father    • Stroke Father          Medications:   Outpatient Medications Marked as Taking for the 10/21/21 encounter (Office Visit) with Donal Ball M.D.   Medication Sig Dispense Refill   • AMILoride (MIDAMOR) 5 MG Tab TAKE ONE TABLET BY MOUTH EVERY DAY AS NEEDED FOR ANKLE OR LEG SWELLING/EDEMA 100 tablet 4   • atenolol (TENORMIN) 50 MG Tab Take 1 tablet by mouth every day. 100 tablet 4   • liothyronine (CYTOMEL) 25 MCG Tab Take 1 tablet by mouth every day. 100 tablet 4   • thyroid (ARMOUR THYROID) 120 MG Tab TAKE 1/2 TABLET ORALLY TWICE A  tablet 4   • thyroid (ARMOUR THYROID) 90 MG Tab TAKE 1 TABLET BY MOUTH ONCE DAILY 100 tablet 4   • traZODone (DESYREL) 150 MG Tab Take 1 tablet by mouth at bedtime. 100 tablet 4   • nitrofurantoin (MACRODANTIN) 50 MG Cap TAKE ONE CAPSULE BY MOUTH FOUR  "TIMES A  Cap 4   • acetaminophen (TYLENOL) 500 MG Tab Take 500-1,000 mg by mouth every 6 hours as needed.     • Glucos-Chond-Hyal Ac-Ca Fructo ([facility]) Tab Take  by mouth every day.       Current Facility-Administered Medications for the 10/21/21 encounter (Office Visit) with Donal Ball M.D.   Medication Dose Route Frequency Provider Last Rate Last Admin   • dexamethasone (DECADRON) injection 8 mg  8 mg Injection Once Donal Ball M.D.            Current Outpatient Medications on File Prior to Visit   Medication Sig Dispense Refill   • AMILoride (MIDAMOR) 5 MG Tab TAKE ONE TABLET BY MOUTH EVERY DAY AS NEEDED FOR ANKLE OR LEG SWELLING/EDEMA 100 tablet 4   • atenolol (TENORMIN) 50 MG Tab Take 1 tablet by mouth every day. 100 tablet 4   • liothyronine (CYTOMEL) 25 MCG Tab Take 1 tablet by mouth every day. 100 tablet 4   • thyroid (ARMOUR THYROID) 120 MG Tab TAKE 1/2 TABLET ORALLY TWICE A  tablet 4   • thyroid (ARMOUR THYROID) 90 MG Tab TAKE 1 TABLET BY MOUTH ONCE DAILY 100 tablet 4   • traZODone (DESYREL) 150 MG Tab Take 1 tablet by mouth at bedtime. 100 tablet 4   • nitrofurantoin (MACRODANTIN) 50 MG Cap TAKE ONE CAPSULE BY MOUTH FOUR TIMES A  Cap 4   • acetaminophen (TYLENOL) 500 MG Tab Take 500-1,000 mg by mouth every 6 hours as needed.     • Glucos-Chond-Hyal Ac-Ca Fructo ([facility]) Tab Take  by mouth every day.       No current facility-administered medications on file prior to visit.         Allergies:   Allergies   Allergen Reactions   • Alcohol Anaphylaxis     ETOH   • Asa [Aspirin] Anaphylaxis   • Bee Anaphylaxis   • Iodine Hives and Shortness of Breath     Contrast; \"will black out\"   • Shellfish Allergy Anaphylaxis   • Sulfa Drugs Anaphylaxis     \"will kill me\"   • Tetanus Antitoxin Nausea and Swelling     \"Swelling and violently ill\"; increased temp!!!!!!   • Tape Contact Dermatitis     Paper tape ok; will get welts and peeling " "skin with regular tape   • Influenza Vaccines Unspecified     Local reaction with deltoid pain swelling and swelling   • Other Environmental      Pt states that she is allergic \"to the sun\"   • Synthroid [Levothroid]      Nausea fatigue malaise       Social Hx:   Social History     Socioeconomic History   • Marital status:      Spouse name: Not on file   • Number of children: Not on file   • Years of education: Not on file   • Highest education level: Not on file   Occupational History   • Not on file   Tobacco Use   • Smoking status: Former Smoker     Packs/day: 1.00     Years: 4.00     Pack years: 4.00     Types: Cigarettes     Quit date: 1965     Years since quittin.8   • Smokeless tobacco: Former User   Vaping Use   • Vaping Use: Never used   Substance and Sexual Activity   • Alcohol use: No     Comment: ALLERGIC TO IT   • Drug use: No   • Sexual activity: Not Currently   Other Topics Concern   •  Service No   • Blood Transfusions No   • Caffeine Concern No   • Occupational Exposure No   • Hobby Hazards No   • Sleep Concern Yes   • Stress Concern No   • Weight Concern No   • Special Diet No   • Back Care No   • Exercise No   • Bike Helmet No   • Seat Belt Yes   • Self-Exams Yes   Social History Narrative   • Not on file     Social Determinants of Health     Financial Resource Strain:    • Difficulty of Paying Living Expenses:    Food Insecurity:    • Worried About Running Out of Food in the Last Year:    • Ran Out of Food in the Last Year:    Transportation Needs:    • Lack of Transportation (Medical):    • Lack of Transportation (Non-Medical):    Physical Activity:    • Days of Exercise per Week:    • Minutes of Exercise per Session:    Stress:    • Feeling of Stress :    Social Connections:    • Frequency of Communication with Friends and Family:    • Frequency of Social Gatherings with Friends and Family:    • Attends Taoist Services:    • Active Member of Clubs or Organizations:  " "  • Attends Club or Organization Meetings:    • Marital Status:    Intimate Partner Violence:    • Fear of Current or Ex-Partner:    • Emotionally Abused:    • Physically Abused:    • Sexually Abused:          EXAMINATION     Physical Exam:   Vitals: /72 (BP Location: Right arm, Patient Position: Sitting, BP Cuff Size: Adult)   Pulse 81   Temp 36.1 °C (96.9 °F) (Temporal)   Ht 1.6 m (5' 3\")   Wt 79.4 kg (175 lb)   SpO2 95%     Constitutional:   Body Habitus: Body mass index is 31 kg/m².  Cooperation: Fully cooperates with exam  Appearance: Well-groomed no disheveled    Respiratory-  breathing comfortable on room air, no audible wheezing  Cardiovascular- capillary refills less than 2 seconds. No lower extremity edema is noted.   Psychiatric- alert and oriented ×3. Normal affect.    MSK/NEURO: -    FAIRs maneuver and Izaiah's maneuver negative bilaterally.  Full range of motion lumbar spine.  Strength is 5 out of 5 in bilateral lower extremities.  Sensation intact.  There is tenderness palpation of the bilateral greater trochanters which reproduces the patient's pain over the greater trochanteric bursa.      MEDICAL DECISION MAKING    DATA    Labs:   Lab Results   Component Value Date/Time    SODIUM 139 05/28/2021 06:18 AM    POTASSIUM 4.4 05/28/2021 06:18 AM    CHLORIDE 108 05/28/2021 06:18 AM    CO2 21 05/28/2021 06:18 AM    GLUCOSE 111 (H) 05/28/2021 06:18 AM    BUN 15 05/28/2021 06:18 AM    CREATININE 0.51 05/28/2021 06:18 AM    CREATININE 0.63 01/31/2013 08:06 AM    BUNCREATRAT 29 (H) 08/08/2013 08:11 AM    BUNCREATRAT 29 (H) 01/31/2013 08:06 AM        No results found for: PROTHROMBTM, INR     Lab Results   Component Value Date/Time    WBC 3.9 (L) 05/28/2021 06:18 AM    WBC 4.7 01/31/2013 08:06 AM    RBC 5.19 05/28/2021 06:18 AM    RBC 5.14 01/31/2013 08:06 AM    HEMOGLOBIN 15.0 05/28/2021 06:18 AM    HEMATOCRIT 45.2 05/28/2021 06:18 AM    MCV 87.1 05/28/2021 06:18 AM    MCV 81 01/31/2013 08:06 AM    " MCH 28.9 2021 06:18 AM    MCH 27.6 2013 08:06 AM    MCHC 33.2 (L) 2021 06:18 AM    MPV 10.3 2021 06:18 AM    NEUTSPOLYS 54.80 2021 06:18 AM    LYMPHOCYTES 29.00 2021 06:18 AM    MONOCYTES 9.70 2021 06:18 AM    EOSINOPHILS 4.40 2021 06:18 AM    BASOPHILS 1.80 2021 06:18 AM    HYPOCHROMIA 1+ 2014 06:04 AM        Lab Results   Component Value Date/Time    HBA1C 5.8 (H) 10/23/2019 06:08 AM            DIAGNOSIS   Visit Diagnoses     ICD-10-CM   1. Greater trochanteric bursitis of both hips  M70.61    M70.62   2. Bilateral hip joint arthritis  M16.0   3. Lumbar spondylosis  M47.816         ASSESSMENT and PLAN:     Amanda Carr [place]  1950,   female      Amanda Carr was seen today for follow-up.    Diagnoses and all orders for this visit:    Greater trochanteric bursitis of both hips  Comments:  Not controlled, we decided to proceed today with bilateral greater trochanteric bursa injections with ultrasound guidance.   Orders:  -     dexamethasone (DECADRON) injection 8 mg    Bilateral hip joint arthritis  Comments:  Stable, significantly improved after injections, continue home exercise program    Lumbar spondylosis  Comments:  Stable, continue home exercise program       Continue Tylenol up to 1000 mg 3 times daily as needed pain not to exceed 3000 mg/day. We considered meloxicam but the patient is allergic to aspirin so we will avoid NSAIDs as well with her.      Thank you for allowing me to participate in the care of this patient. If you have any questions please not hesitate to contact me.          Please note that this dictation was created using voice recognition software. I have made every reasonable attempt to correct obvious errors but there may be errors of grammar and content that I may have overlooked prior to finalization of this note.      Donal Ball MD  Interventional Spine and Sports Physiatry  Physical Medicine and  [facility]

## 2021-10-21 NOTE — PROCEDURES
Date of Service: 10/21/2021    Physician/s: Donal Ball MD    Pre-operative Diagnosis: BILATERAL  greater trochanteric bursitis    Post-operative Diagnosis: BILATERAL  greater trochanteric bursitis    Procedure: BILATERAL  greater trochanteric bursa injection ultrasound-guided    Description of procedure:    The risks, benefits, and alternatives of the procedure were reviewed and discussed with the patient.  Written informed consent was freely obtained. A pre-procedural time-out was conducted by the physician verifying patient’s identity, procedure to be performed, procedure site and side, and allergy verification. Appropriate equipment was determined to be in place for the procedure.     No sedation was used for this procedure.     In the office suite the patient was placed in a lateral position with his RIGHT  side up, and the skin was prepped and draped in the usual sterile fashion. The ultrasound probe was placed over the RIGHT  greater trochanter and the joint space was located and the target for injection was marked. 1% lidocaine was used as a local anesthetic with 27g needle. A 25g 3.5 inch needle was placed into skin and advanced under ultrasound guidance into the greater trochanteric bursa. Following negative aspiration, approx 4cc of 1% lidocaine with 1cc of  4mg/mL dexamethasone was then injected into the joint, and the needle was subsequently removed intact after restyleted. The patient's hip was wiped with a 4x4 gauze, the area was cleansed with alcohol prep, and a bandaid was applied. There were no complications noted. The images were saved for permanent storage.     In the office suite the patient was placed in a lateral position with his LEFT  side up, and the skin was prepped and draped in the usual sterile fashion. The ultrasound probe was placed over the LEFT  greater trochanter and the joint space was located and the target for injection was marked. 1% lidocaine was used as a local anesthetic  with 27g needle. A 25g 3.5 inch needle was placed into skin and advanced under ultrasound guidance into the greater trochanteric bursa. Following negative aspiration, approx 4cc of 1% lidocaine with 1cc of  4mg/mL dexamethasone was then injected into the joint, and the needle was subsequently removed intact after restyleted. The patient's hip was wiped with a 4x4 gauze, the area was cleansed with alcohol prep, and a bandaid was applied. There were no complications noted. The images were saved for permanent storage.        Donal Ball MD  Interventional Spine and Pain  Physical Medicine and Rehabilitation  [facility]

## 2022-03-31 DIAGNOSIS — E03.4 HYPOTHYROIDISM DUE TO ACQUIRED ATROPHY OF THYROID: ICD-10-CM

## 2022-04-04 RX ORDER — THYROID 120 MG/1
TABLET ORAL
Qty: 100 TABLET | Refills: 4 | Status: SHIPPED | OUTPATIENT
Start: 2022-04-04 | End: 2022-06-29

## 2022-04-04 RX ORDER — THYROID 90 MG/1
TABLET ORAL
Qty: 100 TABLET | Refills: 4 | Status: SHIPPED | OUTPATIENT
Start: 2022-04-04

## 2022-05-25 ENCOUNTER — TELEPHONE (OUTPATIENT)
Dept: PHYSICAL MEDICINE AND REHAB | Facility: MEDICAL CENTER | Age: 72
End: 2022-05-25
Payer: MEDICARE

## 2022-05-27 NOTE — TELEPHONE ENCOUNTER
[phone]  Pt called asking for a call back.  Called back, phone goes to  automatically. LV to call back

## 2022-06-07 ENCOUNTER — OFFICE VISIT (OUTPATIENT)
Dept: PHYSICAL MEDICINE AND REHAB | Facility: MEDICAL CENTER | Age: 72
End: 2022-06-07
Payer: MEDICARE

## 2022-06-07 VITALS
HEIGHT: 63 IN | WEIGHT: 187 LBS | BODY MASS INDEX: 33.13 KG/M2 | SYSTOLIC BLOOD PRESSURE: 124 MMHG | TEMPERATURE: 97.4 F | DIASTOLIC BLOOD PRESSURE: 62 MMHG | HEART RATE: 76 BPM | OXYGEN SATURATION: 94 %

## 2022-06-07 DIAGNOSIS — M16.0 BILATERAL HIP JOINT ARTHRITIS: ICD-10-CM

## 2022-06-07 DIAGNOSIS — M70.62 GREATER TROCHANTERIC BURSITIS OF BOTH HIPS: ICD-10-CM

## 2022-06-07 DIAGNOSIS — M70.61 GREATER TROCHANTERIC BURSITIS OF BOTH HIPS: ICD-10-CM

## 2022-06-07 DIAGNOSIS — M47.816 LUMBAR SPONDYLOSIS: ICD-10-CM

## 2022-06-07 PROCEDURE — 99214 OFFICE O/P EST MOD 30 MIN: CPT | Performed by: PHYSICAL MEDICINE & REHABILITATION

## 2022-06-07 ASSESSMENT — PATIENT HEALTH QUESTIONNAIRE - PHQ9
CLINICAL INTERPRETATION OF PHQ2 SCORE: 3
SUM OF ALL RESPONSES TO PHQ QUESTIONS 1-9: 3
5. POOR APPETITE OR OVEREATING: 0 - NOT AT ALL

## 2022-06-07 ASSESSMENT — FIBROSIS 4 INDEX: FIB4 SCORE: 1.25

## 2022-06-07 ASSESSMENT — PAIN SCALES - GENERAL: PAINLEVEL: 10=SEVERE PAIN

## 2022-06-07 NOTE — PATIENT INSTRUCTIONS
Your procedure will be at the [facility] special procedure suite.    [facility] [place] [address]       PRE-PROCEDURE INSTRUCTIONS  You may take your regular medications except:   No Anti-inflammatories 5 days prior to your procedure. Anti-inflammatories include medicines such as  ibuprofen (Motrin, Advil), Excedrin, Naproxen (Aleve, Anaprox, Naprelan, Naprosyn), Celecoxib (Celebrex), Diclofenac (Voltaren-XR tab), and Meloxicam (Mobic).   You can take the remainder of your pain medications as prescribed.   If you are having a diagnostic procedure such as a medial branch block, do not use her pain meds on the day of the procedure  No Glucophage or Metformin 24 hours before your procedure. You may resume next day after your procedure.  Call the physiatry office if you are taking or prescribed anti-biotics within five days of procedure.  Please ask provider if you are taking any new diabetes medication.  CONTINUE TAKING BLOOD PRESSURE MEDICATIONS AS PRESCRIBED.  Pain medications will not be prescribed on the procedure day. Procedural pain medication may be used by your provider   Call your doctor's office performing the procedure if you have a fever, chills, rash or new illness prior to your procedure    Anticoagulation/antiplatelet medications  No Blood thinning medications such as Coumadin, Xarelto, aspirin or Plavix 5 days prior to procedure unless your doctor said to continue these medications. Call your doctor if a new medication is prescribed in this class.     Restrictions for eating before procedure:   If you are getting procedural sedation, then do not eat to for 8 hours prior to procedure appointment time. Do not drink fluids for four hours prior to your procedure time.   If you are not having procedural sedation, then Skip the meal prior to your procedure. If you have a morning procedure then skip breakfast. If you have an afternoon procedure then skip lunch.   You may drink clear liquids  up to 2 hours prior to your procedure  You must have a  the day of procedure to accompany you home.      POST PROCEDURE INSTRUCTIONS   No heavy lifting, strenuous bending or strenuous exercise for 3 days after your procedure.  No hot tubs, baths, swimming for 3 days after your procedure  You can remove the bandage the day after the procedure.  IF YOU RECEIVED A STEROID INJECTION. PLEASE NOTE THAT THERE MAY BE A DELAY FOR THE INJECTION TO START WORKING, THE DELAY MAY BE UP TO TWO WEEKS. IF YOU HAVE DIABETES, PLEASE NOTE THAT YOUR SUGAR LEVELS MAY BE ELEVATED FOR 1-2 DAYS AFTER A STEROID INJECTION.  THE STEROID MAY CAUSE TEMPORARY SYMPTOMS WHICH USUALLY RESOLVE ON THEIR OWN WITHIN 1 TO 2 DAYS INCLUDING FACIAL FLUSHING OR A FEELING OF WARMTH ON THE FACE, TEMPORARY INCREASES IN BLOOD SUGAR, INSOMNIA, INCREASED HUNGER  IF YOU EXPERIENCE PROLONGED WEAKNESS LONGER THAN ONE DAY, BOWEL OR BLADDER INCONTINENCE THEN PLEASE CALL THE PHYSIATRY OFFICE.  Your leg may feel heavy, weak and numb for up to 1-2 days. Be very careful walking.    You may resume normal activities 3 days after procedure.

## 2022-06-07 NOTE — PROGRESS NOTES
Follow up patient note  Interventional spine and sports physiatry, Physical medicine rehabilitation      Chief complaint:   Chief Complaint   Patient presents with   • Follow-Up     Hip pain          HISTORY    Please see new patient note dated  by Dr Ball,  for more details.     HPI  Patient identification: Amanda Whitmore  1950,  female  With Diagnoses of Bilateral hip joint arthritis, Greater trochanteric bursitis of both hips, and Lumbar spondylosis were pertinent to this visit.     The patient is getting about 6 months of pain relief of her hip pain and greater trochanteric bursa pain after intra-articular hip injections as well as greater trochanteric bursa injections.  Her pain has returned and his pain is 8 out of 10 in intensity constant bilateral anterior lateral and posterior hip pain.  Worse with standing walking bending lifting.  Nonradiating.  Denies numbness tingling weakness.    ROS Red Flags :   Fever, Chills, Sweats: Denies  Involuntary Weight Loss: Denies  Bowel/Bladder Incontinence: Denies  Saddle Anesthesia: Denies        PMHx:   Past Medical History:   Diagnosis Date   • Anesthesia     vertigo, PONV has trouble clearing anesthesia secondary to decreased kidney function   • Arthritis     hips, hands, knees   • Bronchitis 2014   • Cancer (HCC) 2015    brain, radiation 2015   • Coughing blood    • GERD (gastroesophageal reflux disease)     on med   • Heart burn    • Hiatus hernia syndrome    • Hypertension    • Indigestion    • Pain 14    bilateral hips 4/10   • Pneumonia 2013   • Renal disorder     chronic insufficiency 40-80% fx (per pt; most recent BUN/Creat were WNL); gets kidney infections easily   • Sleep apnea     uses breath right strips   • Snoring    • Thyroid disease    • Type II or unspecified type diabetes mellitus without mention of complication, not stated as uncontrolled     Diet controlled       PSHx:   Past Surgical History:   Procedure  Laterality Date   • WI DRAIN/INJECT LARGE JOINT/BURSA Bilateral 9/21/2021    Procedure: BILATERAL hip injection;  Surgeon: Donal Ball M.D.;  [facility];  Service: Pain Management   • WI FLUOROSCOPIC GUIDANCE NEEDLE PLACEMENT Bilateral 9/21/2021    Procedure: .;  Surgeon: Donal Ball M.D.;  Location: [facility];  Service: Pain Management   • BUNIONECTOMY DIXIE Right 12/15/2016    Procedure: BUNIONECTOMY DIXIE - BRIAN;  Surgeon: Shankar Armas D.P.M.;  Location: SURGERY [facility];  Service:    • JOINT INJECTION DIAGNOSTIC  4/16/2015    Performed by Frank Robertson M.D. at SURGERY [facility]   • JOINT INJECTION DIAGNOSTIC  8/28/2014    Performed by Frank Robertson M.D. at SURGERY [facility]   • JOINT INJECTION DIAGNOSTIC  4/17/2014    Performed by Frank Robertson M.D. at SURGERY [facility]   • CHOLECYSTECTOMY  1998   • ABDOMINAL HYSTERECTOMY TOTAL  1980's   • TONSILLECTOMY      as a child       Family history   Family History   Problem Relation Age of Onset   • Heart Attack Mother    • Cancer Father    • Hypertension Father    • Stroke Father          Medications:   Outpatient Medications Marked as Taking for the 6/7/22 encounter (Office Visit) with Donal Ball M.D.   Medication Sig Dispense Refill   • liothyronine (CYTOMEL) 25 MCG Tab TAKE ONE TABLET BY MOUTH EVERY  Tablet 4   • atenolol (TENORMIN) 50 MG Tab TAKE ONE TABLET BY MOUTH EVERY  Tablet 4   • AMILoride (MIDAMOR) 5 MG Tab TAKE ONE TABLET BY MOUTH EVERY DAY AS NEEDED FOR ANKLE OR LEG SWELLING/EDEMA 100 Tablet 4   • thyroid (ARMOUR THYROID) 120 MG Tab TAKE ONE-HALF TABLET BY MOUTH TWICE A  Tablet 4   • thyroid (ARMOUR THYROID) 90 MG Tab TAKE ONE TABLET BY MOUTH EVERY  Tablet 4   • traZODone (DESYREL) 150 MG Tab Take 1 tablet by mouth at bedtime. 100 tablet 4   • nitrofurantoin (MACRODANTIN) 50 MG Cap TAKE ONE CAPSULE BY MOUTH FOUR TIMES A  " Cap 4   • acetaminophen (TYLENOL) 500 MG Tab Take 500-1,000 mg by mouth every 6 hours as needed.     • Glucos-Chond-Hyal Ac-Ca Fructo ([facility] ADVANCE) Tab Take  by mouth every day.          Current Outpatient Medications on File Prior to Visit   Medication Sig Dispense Refill   • liothyronine (CYTOMEL) 25 MCG Tab TAKE ONE TABLET BY MOUTH EVERY  Tablet 4   • atenolol (TENORMIN) 50 MG Tab TAKE ONE TABLET BY MOUTH EVERY  Tablet 4   • AMILoride (MIDAMOR) 5 MG Tab TAKE ONE TABLET BY MOUTH EVERY DAY AS NEEDED FOR ANKLE OR LEG SWELLING/EDEMA 100 Tablet 4   • thyroid (ARMOUR THYROID) 120 MG Tab TAKE ONE-HALF TABLET BY MOUTH TWICE A  Tablet 4   • thyroid (ARMOUR THYROID) 90 MG Tab TAKE ONE TABLET BY MOUTH EVERY  Tablet 4   • traZODone (DESYREL) 150 MG Tab Take 1 tablet by mouth at bedtime. 100 tablet 4   • nitrofurantoin (MACRODANTIN) 50 MG Cap TAKE ONE CAPSULE BY MOUTH FOUR TIMES A  Cap 4   • acetaminophen (TYLENOL) 500 MG Tab Take 500-1,000 mg by mouth every 6 hours as needed.     • Glucos-Chond-Hyal Ac-Ca Fructo ([facility]VANCE) Tab Take  by mouth every day.       No current facility-administered medications on file prior to visit.         Allergies:   Allergies   Allergen Reactions   • Alcohol Anaphylaxis     ETOH   • Asa [Aspirin] Anaphylaxis   • Bee Anaphylaxis   • Iodine Hives and Shortness of Breath     Contrast; \"will black out\"   • Shellfish Allergy Anaphylaxis   • Sulfa Drugs Anaphylaxis     \"will kill me\"   • Tetanus Antitoxin Nausea and Swelling     \"Swelling and violently ill\"; increased temp!!!!!!   • Tape Contact Dermatitis     Paper tape ok; will get welts and peeling skin with regular tape   • Influenza Vaccines Unspecified     Local reaction with deltoid pain swelling and swelling   • Other Environmental      Pt states that she is allergic \"to the sun\"   • Synthroid [Levothroid]      Nausea fatigue malaise       Social Hx:   Social " "History     Socioeconomic History   • Marital status:      Spouse name: Not on file   • Number of children: Not on file   • Years of education: Not on file   • Highest education level: Not on file   Occupational History   • Not on file   Tobacco Use   • Smoking status: Former Smoker     Packs/day: 1.00     Years: 4.00     Pack years: 4.00     Types: Cigarettes     Quit date: 1965     Years since quittin.4   • Smokeless tobacco: Former User   Vaping Use   • Vaping Use: Never used   Substance and Sexual Activity   • Alcohol use: No     Comment: ALLERGIC TO IT   • Drug use: No   • Sexual activity: Not Currently   Other Topics Concern   •  Service No   • Blood Transfusions No   • Caffeine Concern No   • Occupational Exposure No   • Hobby Hazards No   • Sleep Concern Yes   • Stress Concern No   • Weight Concern No   • Special Diet No   • Back Care No   • Exercise No   • Bike Helmet No   • Seat Belt Yes   • Self-Exams Yes   Social History Narrative   • Not on file     Social Determinants of Health     Financial Resource Strain: Not on file   Food Insecurity: Not on file   Transportation Needs: Not on file   Physical Activity: Not on file   Stress: Not on file   Social Connections: Not on file   Intimate Partner Violence: Not on file   Housing Stability: Not on file         EXAMINATION     Physical Exam:   Vitals: /62 (BP Location: Left arm, Patient Position: Sitting, BP Cuff Size: Adult)   Pulse 76   Temp 36.3 °C (97.4 °F) (Temporal)   Ht 1.6 m (5' 3\")   Wt 84.8 kg (187 lb)   SpO2 94%     Constitutional:   Body Habitus: Body mass index is 33.13 kg/m².  Cooperation: Fully cooperates with exam  Appearance: Well-groomed no disheveled    Respiratory-  breathing comfortable on room air, no audible wheezing  Cardiovascular- capillary refills less than 2 seconds. No lower extremity edema is noted.   Psychiatric- alert and oriented ×3. Normal affect.    MSK/NEURO: -    Strength is 5 out of 5 in " the bilateral lower extremities.  Izaiah's and FAIRs maneuver are positive for hip pain bilaterally.  Tenderness palpation of the greater trochanteric bursa bilaterally      MEDICAL DECISION MAKING    DATA    Labs:   Lab Results   Component Value Date/Time    SODIUM 139 2021 06:18 AM    POTASSIUM 4.4 2021 06:18 AM    CHLORIDE 108 2021 06:18 AM    CO2 21 2021 06:18 AM    GLUCOSE 111 (H) 2021 06:18 AM    BUN 15 2021 06:18 AM    CREATININE 0.51 2021 06:18 AM    CREATININE 0.63 2013 08:06 AM    BUNCREATRAT 29 (H) 2013 08:11 AM    BUNCREATRAT 29 (H) 2013 08:06 AM        No results found for: PROTHROMBTM, INR     Lab Results   Component Value Date/Time    WBC 3.9 (L) 2021 06:18 AM    WBC 4.7 2013 08:06 AM    RBC 5.19 2021 06:18 AM    RBC 5.14 2013 08:06 AM    HEMOGLOBIN 15.0 2021 06:18 AM    HEMATOCRIT 45.2 2021 06:18 AM    MCV 87.1 2021 06:18 AM    MCV 81 2013 08:06 AM    MCH 28.9 2021 06:18 AM    MCH 27.6 2013 08:06 AM    MCHC 33.2 (L) 2021 06:18 AM    MPV 10.3 2021 06:18 AM    NEUTSPOLYS 54.80 2021 06:18 AM    LYMPHOCYTES 29.00 2021 06:18 AM    MONOCYTES 9.70 2021 06:18 AM    EOSINOPHILS 4.40 2021 06:18 AM    BASOPHILS 1.80 2021 06:18 AM    HYPOCHROMIA 1+ 2014 06:04 AM        Lab Results   Component Value Date/Time    HBA1C 5.8 (H) 10/23/2019 06:08 AM            DIAGNOSIS   Visit Diagnoses     ICD-10-CM   1. Bilateral hip joint arthritis  M16.0   2. Greater trochanteric bursitis of both hips  M70.61    M70.62   3. Lumbar spondylosis  M47.816         ASSESSMENT and PLAN:     Amanda Whitmore  1950,   female      Amanda was seen today for follow-up.    Diagnoses and all orders for this visit:    Bilateral hip joint arthritis  -     Referral to Physical Medicine Rehab    Greater trochanteric bursitis of both hips  -     Referral to Physical  Medicine Rehab    Lumbar spondylosis       The patient has had a recurrence of her bilateral hip arthritis and bilateral greater trochanteric bursitis.  She has been getting about 6 months of pain relief after injections done both intra-articular and in the greater trochanteric bursa.  When we have tried to separate these she is still required both injections in the past.  She has done conservative treatment including a comprehensive pain program including the past 6 months with home exercise program and medication management.    I have ordered bilateral intra-articular hip injections and greater trochanteric bursa injections to be done with fluoroscopic guidance.    The risks benefits and alternatives to this procedure were discussed and the patient wishes to proceed with the procedure. Risks include but are not limited to damage to surrounding structures, infection, bleeding, worsening of pain which can be permanent, weakness which can be permanent. Benefits include pain relief, improved function. Alternatives includes not doing the procedure.        Continue Tylenol up to 1000 mg 3 times daily as needed pain not to exceed 3000 mg/day. We considered meloxicam but the patient is allergic to aspirin so we will avoid NSAIDs as well with her.    Follow-up after the above procedure    Thank you for allowing me to participate in the care of this patient. If you have any questions please not hesitate to contact me.          Please note that this dictation was created using voice recognition software. I have made every reasonable attempt to correct obvious errors but there may be errors of grammar and content that I may have overlooked prior to finalization of this note.      Donal Ball MD  Interventional Spine and Sports Physiatry  Physical Medicine and Rehabilitation  [facility]

## 2022-06-07 NOTE — H&P (VIEW-ONLY)
Follow up patient note  Interventional spine and sports physiatry, Physical medicine rehabilitation      Chief complaint:   Chief Complaint   Patient presents with   • Follow-Up     Hip pain          HISTORY    Please see new patient note dated  by Dr Ball,  for more details.     HPI  Patient identification: Amanda Whitmore  1950,  female  With Diagnoses of Bilateral hip joint arthritis, Greater trochanteric bursitis of both hips, and Lumbar spondylosis were pertinent to this visit.     The patient is getting about 6 months of pain relief of her hip pain and greater trochanteric bursa pain after intra-articular hip injections as well as greater trochanteric bursa injections.  Her pain has returned and his pain is 8 out of 10 in intensity constant bilateral anterior lateral and posterior hip pain.  Worse with standing walking bending lifting.  Nonradiating.  Denies numbness tingling weakness.    ROS Red Flags :   Fever, Chills, Sweats: Denies  Involuntary Weight Loss: Denies  Bowel/Bladder Incontinence: Denies  Saddle Anesthesia: Denies        PMHx:   Past Medical History:   Diagnosis Date   • Anesthesia     vertigo, PONV has trouble clearing anesthesia secondary to decreased kidney function   • Arthritis     hips, hands, knees   • Bronchitis 2014   • Cancer (HCC) 2015    brain, radiation 2015   • Coughing blood    • GERD (gastroesophageal reflux disease)     on med   • Heart burn    • Hiatus hernia syndrome    • Hypertension    • Indigestion    • Pain 14    bilateral hips 4/10   • Pneumonia 2013   • Renal disorder     chronic insufficiency 40-80% fx (per pt; most recent BUN/Creat were WNL); gets kidney infections easily   • Sleep apnea     uses breath right strips   • Snoring    • Thyroid disease    • Type II or unspecified type diabetes mellitus without mention of complication, not stated as uncontrolled     Diet controlled       PSHx:   Past Surgical History:   Procedure  Laterality Date   • [place] DRAIN/INJECT LARGE JOINT/BURSA Bilateral 9/21/2021    Procedure: BILATERAL hip injection;  Surgeon: Donal Ball M.D.;  Location: [facility];  Service: Pain Management   • [place] FLUOROSCOPIC GUIDANCE NEEDLE PLACEMENT Bilateral 9/21/2021    Procedure: .;  Surgeon: Donal Ball M.D.;  Location: [facility];  Service: Pain Management   • BUNIONECTOMY DIXIE Right 12/15/2016    Procedure: BUNIONECTOMY DIXIE - BRIAN;  Surgeon: Shankar Armas D.P.M.;  Location: SURGERY [facility];  Service:    • JOINT INJECTION DIAGNOSTIC  4/16/2015    Performed by Frank Robertson M.D. at SURGERY [facility]   • JOINT INJECTION DIAGNOSTIC  8/28/2014    Performed by Frank Robertson M.D. at SURGERY [facility]   • JOINT INJECTION DIAGNOSTIC  4/17/2014    Performed by Frank Robertson M.D. at SURGERY [facility]   • CHOLECYSTECTOMY  1998   • ABDOMINAL HYSTERECTOMY TOTAL  1980's   • TONSILLECTOMY      as a child       Family history   Family History   Problem Relation Age of Onset   • Heart Attack Mother    • Cancer Father    • Hypertension Father    • Stroke Father          Medications:   Outpatient Medications Marked as Taking for the 6/7/22 encounter (Office Visit) with Donal Ball M.D.   Medication Sig Dispense Refill   • liothyronine (CYTOMEL) 25 MCG Tab TAKE ONE TABLET BY MOUTH EVERY  Tablet 4   • atenolol (TENORMIN) 50 MG Tab TAKE ONE TABLET BY MOUTH EVERY  Tablet 4   • AMILoride (MIDAMOR) 5 MG Tab TAKE ONE TABLET BY MOUTH EVERY DAY AS NEEDED FOR ANKLE OR LEG SWELLING/EDEMA 100 Tablet 4   • thyroid (ARMOUR THYROID) 120 MG Tab TAKE ONE-HALF TABLET BY MOUTH TWICE A  Tablet 4   • thyroid (ARMOUR THYROID) 90 MG Tab TAKE ONE TABLET BY MOUTH EVERY  Tablet 4   • traZODone (DESYREL) 150 MG Tab Take 1 tablet by mouth at bedtime. 100 tablet 4   • nitrofurantoin (MACRODANTIN) 50 MG Cap TAKE ONE CAPSULE BY MOUTH FOUR TIMES A  " Cap 4   • acetaminophen (TYLENOL) 500 MG Tab Take 500-1,000 mg by mouth every 6 hours as needed.     • Glucos-Chond-Hyal Ac-Ca Fructo ([facility] ADVANCE) Tab Take  by mouth every day.          Current Outpatient Medications on File Prior to Visit   Medication Sig Dispense Refill   • liothyronine (CYTOMEL) 25 MCG Tab TAKE ONE TABLET BY MOUTH EVERY  Tablet 4   • atenolol (TENORMIN) 50 MG Tab TAKE ONE TABLET BY MOUTH EVERY  Tablet 4   • AMILoride (MIDAMOR) 5 MG Tab TAKE ONE TABLET BY MOUTH EVERY DAY AS NEEDED FOR ANKLE OR LEG SWELLING/EDEMA 100 Tablet 4   • thyroid (ARMOUR THYROID) 120 MG Tab TAKE ONE-HALF TABLET BY MOUTH TWICE A  Tablet 4   • thyroid (ARMOUR THYROID) 90 MG Tab TAKE ONE TABLET BY MOUTH EVERY  Tablet 4   • traZODone (DESYREL) 150 MG Tab Take 1 tablet by mouth at bedtime. 100 tablet 4   • nitrofurantoin (MACRODANTIN) 50 MG Cap TAKE ONE CAPSULE BY MOUTH FOUR TIMES A  Cap 4   • acetaminophen (TYLENOL) 500 MG Tab Take 500-1,000 mg by mouth every 6 hours as needed.     • Glucos-Chond-Hyal Ac-Ca Fructo ([facility]ANCE) Tab Take  by mouth every day.       No current facility-administered medications on file prior to visit.         Allergies:   Allergies   Allergen Reactions   • Alcohol Anaphylaxis     ETOH   • Asa [Aspirin] Anaphylaxis   • Bee Anaphylaxis   • Iodine Hives and Shortness of Breath     Contrast; \"will black out\"   • Shellfish Allergy Anaphylaxis   • Sulfa Drugs Anaphylaxis     \"will kill me\"   • Tetanus Antitoxin Nausea and Swelling     \"Swelling and violently ill\"; increased temp!!!!!!   • Tape Contact Dermatitis     Paper tape ok; will get welts and peeling skin with regular tape   • Influenza Vaccines Unspecified     Local reaction with deltoid pain swelling and swelling   • Other Environmental      Pt states that she is allergic \"to the sun\"   • Synthroid [Levothroid]      Nausea fatigue malaise       Social Hx:   Social " "History     Socioeconomic History   • Marital status:      Spouse name: Not on file   • Number of children: Not on file   • Years of education: Not on file   • Highest education level: Not on file   Occupational History   • Not on file   Tobacco Use   • Smoking status: Former Smoker     Packs/day: 1.00     Years: 4.00     Pack years: 4.00     Types: Cigarettes     Quit date: 1965     Years since quittin.4   • Smokeless tobacco: Former User   Vaping Use   • Vaping Use: Never used   Substance and Sexual Activity   • Alcohol use: No     Comment: ALLERGIC TO IT   • Drug use: No   • Sexual activity: Not Currently   Other Topics Concern   •  Service No   • Blood Transfusions No   • Caffeine Concern No   • Occupational Exposure No   • Hobby Hazards No   • Sleep Concern Yes   • Stress Concern No   • Weight Concern No   • Special Diet No   • Back Care No   • Exercise No   • Bike Helmet No   • Seat Belt Yes   • Self-Exams Yes   Social History Narrative   • Not on file     Social Determinants of Health     Financial Resource Strain: Not on file   Food Insecurity: Not on file   Transportation Needs: Not on file   Physical Activity: Not on file   Stress: Not on file   Social Connections: Not on file   Intimate Partner Violence: Not on file   Housing Stability: Not on file         EXAMINATION     Physical Exam:   Vitals: /62 (BP Location: Left arm, Patient Position: Sitting, BP Cuff Size: Adult)   Pulse 76   Temp 36.3 °C (97.4 °F) (Temporal)   Ht 1.6 m (5' 3\")   Wt 84.8 kg (187 lb)   SpO2 94%     Constitutional:   Body Habitus: Body mass index is 33.13 kg/m².  Cooperation: Fully cooperates with exam  Appearance: Well-groomed no disheveled    Respiratory-  breathing comfortable on room air, no audible wheezing  Cardiovascular- capillary refills less than 2 seconds. No lower extremity edema is noted.   Psychiatric- alert and oriented ×3. Normal affect.    MSK/NEURO: -    Strength is 5 out of 5 in " the bilateral lower extremities.  Izaiah's and FAIRs maneuver are positive for hip pain bilaterally.  Tenderness palpation of the greater trochanteric bursa bilaterally      MEDICAL DECISION MAKING    DATA    Labs:   Lab Results   Component Value Date/Time    SODIUM 139 2021 06:18 AM    POTASSIUM 4.4 2021 06:18 AM    CHLORIDE 108 2021 06:18 AM    CO2 21 2021 06:18 AM    GLUCOSE 111 (H) 2021 06:18 AM    BUN 15 2021 06:18 AM    CREATININE 0.51 2021 06:18 AM    CREATININE 0.63 2013 08:06 AM    BUNCREATRAT 29 (H) 2013 08:11 AM    BUNCREATRAT 29 (H) 2013 08:06 AM        No results found for: PROTHROMBTM, INR     Lab Results   Component Value Date/Time    WBC 3.9 (L) 2021 06:18 AM    WBC 4.7 2013 08:06 AM    RBC 5.19 2021 06:18 AM    RBC 5.14 2013 08:06 AM    HEMOGLOBIN 15.0 2021 06:18 AM    HEMATOCRIT 45.2 2021 06:18 AM    MCV 87.1 2021 06:18 AM    MCV 81 2013 08:06 AM    MCH 28.9 2021 06:18 AM    MCH 27.6 2013 08:06 AM    MCHC 33.2 (L) 2021 06:18 AM    MPV 10.3 2021 06:18 AM    NEUTSPOLYS 54.80 2021 06:18 AM    LYMPHOCYTES 29.00 2021 06:18 AM    MONOCYTES 9.70 2021 06:18 AM    EOSINOPHILS 4.40 2021 06:18 AM    BASOPHILS 1.80 2021 06:18 AM    HYPOCHROMIA 1+ 2014 06:04 AM        Lab Results   Component Value Date/Time    HBA1C 5.8 (H) 10/23/2019 06:08 AM            DIAGNOSIS   Visit Diagnoses     ICD-10-CM   1. Bilateral hip joint arthritis  M16.0   2. Greater trochanteric bursitis of both hips  M70.61    M70.62   3. Lumbar spondylosis  M47.816         ASSESSMENT and PLAN:     Amanda Whitmore  1950,   female      Amanda was seen today for follow-up.    Diagnoses and all orders for this visit:    Bilateral hip joint arthritis  -     Referral to Physical Medicine Rehab    Greater trochanteric bursitis of both hips  -     Referral to Physical  Medicine Rehab    Lumbar spondylosis       The patient has had a recurrence of her bilateral hip arthritis and bilateral greater trochanteric bursitis.  She has been getting about 6 months of pain relief after injections done both intra-articular and in the greater trochanteric bursa.  When we have tried to separate these she is still required both injections in the past.  She has done conservative treatment including a comprehensive pain program including the past 6 months with home exercise program and medication management.    I have ordered bilateral intra-articular hip injections and greater trochanteric bursa injections to be done with fluoroscopic guidance.    The risks benefits and alternatives to this procedure were discussed and the patient wishes to proceed with the procedure. Risks include but are not limited to damage to surrounding structures, infection, bleeding, worsening of pain which can be permanent, weakness which can be permanent. Benefits include pain relief, improved function. Alternatives includes not doing the procedure.        Continue Tylenol up to 1000 mg 3 times daily as needed pain not to exceed 3000 mg/day. We considered meloxicam but the patient is allergic to aspirin so we will avoid NSAIDs as well with her.    Follow-up after the above procedure    Thank you for allowing me to participate in the care of this patient. If you have any questions please not hesitate to contact me.          Please note that this dictation was created using voice recognition software. I have made every reasonable attempt to correct obvious errors but there may be errors of grammar and content that I may have overlooked prior to finalization of this note.      Donal Ball MD  Interventional Spine and Sports Physiatry  Physical Medicine and Rehabilitation  [facility]

## 2022-06-08 ENCOUNTER — HOSPITAL ENCOUNTER (OUTPATIENT)
Facility: REHABILITATION | Age: 72
End: 2022-06-08
Attending: PHYSICAL MEDICINE & REHABILITATION | Admitting: PHYSICAL MEDICINE & REHABILITATION
Payer: MEDICARE

## 2022-06-08 ENCOUNTER — APPOINTMENT (OUTPATIENT)
Dept: RADIOLOGY | Facility: REHABILITATION | Age: 72
End: 2022-06-08
Attending: PHYSICAL MEDICINE & REHABILITATION
Payer: MEDICARE

## 2022-06-08 VITALS
HEART RATE: 67 BPM | BODY MASS INDEX: 32.42 KG/M2 | HEIGHT: 63 IN | WEIGHT: 182.98 LBS | SYSTOLIC BLOOD PRESSURE: 166 MMHG | DIASTOLIC BLOOD PRESSURE: 82 MMHG | OXYGEN SATURATION: 98 % | RESPIRATION RATE: 16 BRPM | TEMPERATURE: 98.5 F

## 2022-06-08 PROCEDURE — 20610 DRAIN/INJ JOINT/BURSA W/O US: CPT

## 2022-06-08 PROCEDURE — 700117 HCHG RX CONTRAST REV CODE 255

## 2022-06-08 PROCEDURE — 700111 HCHG RX REV CODE 636 W/ 250 OVERRIDE (IP)

## 2022-06-08 RX ORDER — DEXAMETHASONE SODIUM PHOSPHATE 10 MG/ML
INJECTION, SOLUTION INTRAMUSCULAR; INTRAVENOUS
Status: COMPLETED
Start: 2022-06-08 | End: 2022-06-08

## 2022-06-08 RX ORDER — LIDOCAINE HYDROCHLORIDE 10 MG/ML
INJECTION, SOLUTION EPIDURAL; INFILTRATION; INTRACAUDAL; PERINEURAL
Status: COMPLETED
Start: 2022-06-08 | End: 2022-06-08

## 2022-06-08 RX ADMIN — LIDOCAINE HYDROCHLORIDE 10 ML: 10 INJECTION, SOLUTION EPIDURAL; INFILTRATION; INTRACAUDAL; PERINEURAL at 11:09

## 2022-06-08 RX ADMIN — DEXAMETHASONE SODIUM PHOSPHATE 10 MG: 10 INJECTION, SOLUTION INTRAMUSCULAR; INTRAVENOUS at 11:08

## 2022-06-08 ASSESSMENT — PAIN DESCRIPTION - PAIN TYPE: TYPE: CHRONIC PAIN

## 2022-06-08 ASSESSMENT — FIBROSIS 4 INDEX: FIB4 SCORE: 1.25

## 2022-06-08 NOTE — PROGRESS NOTES
Received report from pre-procedure RN..  Pre-assessment completed.  Blood pressure and pulse ox connected to pt by surg tech and RN.  Pt hx reviewed (MERRITT without CPAP use, HTN, Decreased kidney function).  Pt identified and time out performed with team agreeable.

## 2022-06-08 NOTE — INTERVAL H&P NOTE
H&P reviewed. The patient was examined and there are no changes to the H&P     Lungs clear to auscultation bilaterally.  No abdominal tenderness.  Heart regular rate and rhythm.  Vitals reviewed.    Proceed with the originally scheduled procedure.  The risks, benefits and alternatives were discussed.  The patient understands these.    Pre-Op Diagnosis Codes:     * Bilateral hip joint arthritis [M16.0]     * Greater trochanteric bursitis of both hips [M70.61, M70.62]  Procedure(s):  Therapeutic fluoroscopically guided intra-articular BILATERAL intra-articular hip and greater trochanteric bursa injection.    Donal Ball MD  Physical Medicine and Rehabilitation  Interventional Spine and Sports Physiatry  [facility]

## 2022-06-08 NOTE — OP REPORT
Date of Service: 6/8/2022     Patient: Amanda Whitmore 71 y.o. female     MRN: [id number]     Physician/s: Donal Ball MD    Pre-operative Diagnosis: bilateral  hip osteoarthritis and greater trochanteric bursitis    Post-operative Diagnosis: bilateral  hip osteoarthritis and greater trochanteric bursitis    Procedure: bilateral  therapeutic intra-articular Hip injection with fluoroscopic guidance    Description of procedure:    The risks, benefits, and alternatives of the procedure were reviewed and discussed with the patient.  Written informed consent was freely obtained. A pre-procedural time-out was conducted by the physician verifying patient’s identity, procedure to be performed, procedure site and side, and allergy verification. Appropriate equipment was determined to be in place for the procedure.       The femoral artery nerve and vein as well as the inguinal ligament were identified with ultrasound and marked with a marking pen.  The entire procedure took place lateral to the femoral vessels and nerve and inferior to the inguinal ligament.    In the procedure suite the patient was placed in a supine position with and the skin was prepped and draped in the usual sterile fashion. A 27-gauge 1.5 inch needle was used with approximately 1 mL of 1% lidocaine for local anesthesia at the junction of the RIGHT femoral head and neck.  A 25g 3.5 inch needle was placed into skin and advanced under fluoroscopic guidance into the joint space.  No contrast was used because of the patient's shellfish allergy.  4 mL of the above solution was injected was then injected into the joint, and the needle was subsequently removed intact after restyleted.  Fluoroscopic guidance was used to guide the needle to the greater trochanter in the area of the greater trochanteric bursa.  4 mL of the above solution was injected.    The exact same procedure was repeated on the left hip and left greater trochanteric bursa      The patient's  hip was wiped with a 4x4 gauze, the area was cleansed with alcohol prep, and a bandaid was applied. There were no complications noted.       Patient had 100% pain relief post procedure  Follow-up as scheduled      Donal Ball MD  Interventional Spine and Pain  Physical Medicine and Rehabilitation  [facility]          CPT  Major joint/bursa:  20610 x 4  Fluoroscopic  needle guidance [id number]

## 2022-06-08 NOTE — PROGRESS NOTES
1000  Pt received to pre procedure area. ID band and allergies verified. Vital signs taken and stable. Pertinent medical history (Arthritis, Brain cancer, GERD, Chronic renal insufficiency, Sleep Apnea, Thyroid Disease Type 2 Diabetes) reviewed and communicated to procedure RN. Verified that patient has not taken NSAIDS, anticoagulants or blood thinners in past 5 days. Reviewed post op instructions with patient, questions answered, verbalized understanding.     1116 Pt received to recovery area, report received from procedure RN Regi . Vitals taken and stable. Patient tolerated po fluids and snack without difficulty. Dressing clean, dry and intact. Pt declined ice pack. No bleeding noted on band aids.              Pt seen by Dr. Ball post procedure, orders received for discharge. Patient ambulatory without difficulty. Pt discharged to designated .

## 2022-06-09 ENCOUNTER — TELEPHONE (OUTPATIENT)
Dept: PHYSICAL MEDICINE AND REHAB | Facility: MEDICAL CENTER | Age: 72
End: 2022-06-09
Payer: MEDICARE

## 2022-06-09 NOTE — TELEPHONE ENCOUNTER
Post Op: LVM to call us back in regards to her SP that was done with Dr. Ball dated 6/8/22 for her therapeutic Fluoroscopically guided intra-articular BILATERAL intra-articular hip and greater trochanteric bursa injection.

## 2022-06-29 DIAGNOSIS — E03.4 HYPOTHYROIDISM DUE TO ACQUIRED ATROPHY OF THYROID: ICD-10-CM

## 2022-06-29 RX ORDER — THYROID 120 MG/1
TABLET ORAL
Qty: 100 TABLET | Refills: 4 | Status: SHIPPED | OUTPATIENT
Start: 2022-06-29 | End: 2023-05-31

## 2022-06-29 NOTE — TELEPHONE ENCOUNTER
Received request via: Pharmacy    Was the patient seen in the last year in this department? No 4/26/21    Does the patient have an active prescription (recently filled or refills available) for medication(s) requested? No

## 2022-07-06 ENCOUNTER — APPOINTMENT (OUTPATIENT)
Dept: PHYSICAL MEDICINE AND REHAB | Facility: MEDICAL CENTER | Age: 72
End: 2022-07-06
Payer: MEDICARE

## 2022-07-14 ENCOUNTER — TELEPHONE (OUTPATIENT)
Dept: HEALTH INFORMATION MANAGEMENT | Facility: OTHER | Age: 72
End: 2022-07-14

## 2022-08-19 ENCOUNTER — TELEPHONE (OUTPATIENT)
Dept: MEDICAL GROUP | Age: 72
End: 2022-08-19
Payer: MEDICARE

## 2022-08-19 DIAGNOSIS — F51.01 PRIMARY INSOMNIA: ICD-10-CM

## 2022-08-19 NOTE — TELEPHONE ENCOUNTER
Pt was requesting medication refill because she has going to be moving out [place] called pt and left  informing pt that she has refills on file and since we have not seen her since over a year she will have to schedule appt per provider to refill her trazadone. Pt called scheduling at spoke with  and stated that she has been living out of state and needs her medications refilled out of state. Spoke with covering provider Kirstin May we are unable to send medication out of state per [facility] guidelines providers are not licensed out of state.  Paula will call and inform pt she can do minute clinic with [facility] to get medications refilled.

## 2022-08-22 ENCOUNTER — TELEPHONE (OUTPATIENT)
Dept: MEDICAL GROUP | Age: 72
End: 2022-08-22

## 2022-08-22 RX ORDER — TRAZODONE HYDROCHLORIDE 150 MG/1
150 TABLET ORAL
Qty: 100 TABLET | Refills: 4 | Status: SHIPPED | OUTPATIENT
Start: 2022-08-22 | End: 2023-08-28

## 2022-08-22 NOTE — TELEPHONE ENCOUNTER
Patient has called on more than one occasion requesting refills on her medication. She has moved out of state. I let the patient know that she had a Telemed visit more than a year ago and labs done in 5/21. That we generally do not refill rx's if it has been that long since an appt. Patient insists that she speak with Dr Salguero and only Dr Salguero.  [facility]/[phone]

## 2022-09-30 ENCOUNTER — DOCUMENTATION (OUTPATIENT)
Dept: HEALTH INFORMATION MANAGEMENT | Facility: OTHER | Age: 72
End: 2022-09-30

## 2022-11-10 ENCOUNTER — HOSPITAL ENCOUNTER (OUTPATIENT)
Dept: RADIOLOGY | Facility: MEDICAL CENTER | Age: 72
End: 2022-11-10

## 2023-06-14 ENCOUNTER — TELEPHONE (OUTPATIENT)
Dept: MEDICAL GROUP | Age: 73
End: 2023-06-14

## 2023-06-14 DIAGNOSIS — I10 ESSENTIAL HYPERTENSION: ICD-10-CM

## 2023-06-14 DIAGNOSIS — R60.9 EDEMA, UNSPECIFIED TYPE: ICD-10-CM

## 2023-06-14 RX ORDER — AMILORIDE HYDROCHLORIDE 5 MG/1
TABLET ORAL
Qty: 30 TABLET | Refills: 0 | Status: SHIPPED | OUTPATIENT
Start: 2023-06-14

## 2023-06-14 RX ORDER — ATENOLOL 50 MG/1
TABLET ORAL
Qty: 30 TABLET | Refills: 0 | Status: SHIPPED | OUTPATIENT
Start: 2023-06-14

## 2023-06-30 NOTE — TELEPHONE ENCOUNTER
Patient has moved to [facility]. Removed Dr. Salguero as her primary care physician.

## 2024-10-08 NOTE — INTERVAL H&P NOTE
H&P reviewed. The patient was examined and there are no changes to the H&P     Lungs clear to auscultation bilaterally.  No abdominal tenderness.  Heart regular rate and rhythm.  Vitals reviewed.    Proceed with the originally scheduled procedure.  The risks, benefits and alternatives were discussed.  The patient understands these.    Pre-Op Diagnosis Codes:     * Bilateral hip joint arthritis [M16.0]  Procedure(s):  BILATERAL hip injection  .    oDnal Ball MD  Physical Medicine and Rehabilitation  Interventional Spine and Sports Physiatry  [facility]        
0 (no pain/absence of nonverbal indicators of pain)